# Patient Record
Sex: FEMALE | Race: WHITE | Employment: PART TIME | URBAN - METROPOLITAN AREA
[De-identification: names, ages, dates, MRNs, and addresses within clinical notes are randomized per-mention and may not be internally consistent; named-entity substitution may affect disease eponyms.]

---

## 2019-03-03 ENCOUNTER — HOSPITAL ENCOUNTER (EMERGENCY)
Age: 44
Discharge: HOME | End: 2019-03-03

## 2019-03-03 VITALS
RESPIRATION RATE: 20 BRPM | SYSTOLIC BLOOD PRESSURE: 126 MMHG | OXYGEN SATURATION: 95 % | DIASTOLIC BLOOD PRESSURE: 89 MMHG | WEIGHT: 170 LBS | BODY MASS INDEX: 30.12 KG/M2 | HEART RATE: 73 BPM | HEIGHT: 63 IN | TEMPERATURE: 98.5 F

## 2019-03-03 DIAGNOSIS — S29.019A THORACIC MYOFASCIAL STRAIN, INITIAL ENCOUNTER: Primary | ICD-10-CM

## 2019-03-03 LAB
HCG, URINE, POC: NEGATIVE
Lab: NORMAL
NEGATIVE QC PASS/FAIL: NORMAL
POSITIVE QC PASS/FAIL: NORMAL

## 2019-03-03 PROCEDURE — 99283 EMERGENCY DEPT VISIT LOW MDM: CPT

## 2019-03-03 PROCEDURE — 6360000002 HC RX W HCPCS: Performed by: PHYSICIAN ASSISTANT

## 2019-03-03 PROCEDURE — 96372 THER/PROPH/DIAG INJ SC/IM: CPT

## 2019-03-03 RX ORDER — KETOROLAC TROMETHAMINE 10 MG/1
10 TABLET, FILM COATED ORAL EVERY 6 HOURS PRN
Qty: 12 TABLET | Refills: 0 | Status: SHIPPED | OUTPATIENT
Start: 2019-03-03 | End: 2021-10-26

## 2019-03-03 RX ORDER — KETOROLAC TROMETHAMINE 10 MG/1
10 TABLET, FILM COATED ORAL EVERY 6 HOURS PRN
Qty: 12 TABLET | Refills: 0 | Status: SHIPPED | OUTPATIENT
Start: 2019-03-03 | End: 2019-03-03

## 2019-03-03 RX ORDER — ORPHENADRINE CITRATE 100 MG/1
100 TABLET, EXTENDED RELEASE ORAL 2 TIMES DAILY
Qty: 20 TABLET | Refills: 0 | Status: SHIPPED | OUTPATIENT
Start: 2019-03-03 | End: 2019-03-13 | Stop reason: ALTCHOICE

## 2019-03-03 RX ORDER — ORPHENADRINE CITRATE 30 MG/ML
60 INJECTION INTRAMUSCULAR; INTRAVENOUS ONCE
Status: COMPLETED | OUTPATIENT
Start: 2019-03-03 | End: 2019-03-03

## 2019-03-03 RX ORDER — KETOROLAC TROMETHAMINE 30 MG/ML
30 INJECTION, SOLUTION INTRAMUSCULAR; INTRAVENOUS ONCE
Status: COMPLETED | OUTPATIENT
Start: 2019-03-03 | End: 2019-03-03

## 2019-03-03 RX ADMIN — KETOROLAC TROMETHAMINE 30 MG: 30 INJECTION, SOLUTION INTRAMUSCULAR; INTRAVENOUS at 08:51

## 2019-03-03 RX ADMIN — ORPHENADRINE CITRATE 60 MG: 60 INJECTION INTRAMUSCULAR; INTRAVENOUS at 08:51

## 2019-03-03 ASSESSMENT — PAIN SCALES - GENERAL
PAINLEVEL_OUTOF10: 10
PAINLEVEL_OUTOF10: 9

## 2019-03-04 ENCOUNTER — APPOINTMENT (OUTPATIENT)
Dept: CT IMAGING | Age: 44
End: 2019-03-04

## 2019-03-04 ENCOUNTER — HOSPITAL ENCOUNTER (EMERGENCY)
Age: 44
Discharge: HOME | End: 2019-03-04
Attending: EMERGENCY MEDICINE

## 2019-03-04 VITALS
RESPIRATION RATE: 20 BRPM | BODY MASS INDEX: 30.12 KG/M2 | TEMPERATURE: 97.7 F | DIASTOLIC BLOOD PRESSURE: 91 MMHG | SYSTOLIC BLOOD PRESSURE: 134 MMHG | HEIGHT: 63 IN | HEART RATE: 67 BPM | WEIGHT: 170 LBS | OXYGEN SATURATION: 97 %

## 2019-03-04 DIAGNOSIS — M54.2 NECK PAIN: ICD-10-CM

## 2019-03-04 DIAGNOSIS — M62.838 SPASM OF MUSCLE: Primary | ICD-10-CM

## 2019-03-04 PROCEDURE — 96372 THER/PROPH/DIAG INJ SC/IM: CPT

## 2019-03-04 PROCEDURE — 6370000000 HC RX 637 (ALT 250 FOR IP): Performed by: EMERGENCY MEDICINE

## 2019-03-04 PROCEDURE — 99283 EMERGENCY DEPT VISIT LOW MDM: CPT

## 2019-03-04 PROCEDURE — 72125 CT NECK SPINE W/O DYE: CPT

## 2019-03-04 PROCEDURE — 6360000002 HC RX W HCPCS: Performed by: EMERGENCY MEDICINE

## 2019-03-04 RX ORDER — DIAZEPAM 5 MG/1
5 TABLET ORAL ONCE
Status: COMPLETED | OUTPATIENT
Start: 2019-03-04 | End: 2019-03-04

## 2019-03-04 RX ORDER — ORPHENADRINE CITRATE 30 MG/ML
60 INJECTION INTRAMUSCULAR; INTRAVENOUS ONCE
Status: COMPLETED | OUTPATIENT
Start: 2019-03-04 | End: 2019-03-04

## 2019-03-04 RX ORDER — HYDROCODONE BITARTRATE AND ACETAMINOPHEN 5; 325 MG/1; MG/1
1 TABLET ORAL ONCE
Status: COMPLETED | OUTPATIENT
Start: 2019-03-04 | End: 2019-03-04

## 2019-03-04 RX ORDER — KETOROLAC TROMETHAMINE 30 MG/ML
60 INJECTION, SOLUTION INTRAMUSCULAR; INTRAVENOUS ONCE
Status: COMPLETED | OUTPATIENT
Start: 2019-03-04 | End: 2019-03-04

## 2019-03-04 RX ORDER — METHYLPREDNISOLONE 4 MG/1
TABLET ORAL
Qty: 1 KIT | Refills: 0 | Status: SHIPPED | OUTPATIENT
Start: 2019-03-04 | End: 2019-03-10

## 2019-03-04 RX ADMIN — KETOROLAC TROMETHAMINE 60 MG: 30 INJECTION, SOLUTION INTRAMUSCULAR at 03:14

## 2019-03-04 RX ADMIN — ORPHENADRINE CITRATE 60 MG: 60 INJECTION INTRAMUSCULAR; INTRAVENOUS at 03:14

## 2019-03-04 RX ADMIN — HYDROCODONE BITARTRATE AND ACETAMINOPHEN 1 TABLET: 5; 325 TABLET ORAL at 04:19

## 2019-03-04 RX ADMIN — DIAZEPAM 5 MG: 5 TABLET ORAL at 03:13

## 2019-03-04 ASSESSMENT — ENCOUNTER SYMPTOMS
SHORTNESS OF BREATH: 0
WHEEZING: 0
SORE THROAT: 0
ABDOMINAL PAIN: 0
SINUS PRESSURE: 0
ABDOMINAL DISTENTION: 0
DIARRHEA: 0
BACK PAIN: 0
CHEST TIGHTNESS: 0
COUGH: 0
NAUSEA: 0
VOMITING: 0

## 2019-03-04 ASSESSMENT — PAIN SCALES - GENERAL
PAINLEVEL_OUTOF10: 10

## 2019-03-04 ASSESSMENT — PAIN DESCRIPTION - FREQUENCY: FREQUENCY: CONTINUOUS

## 2019-03-04 ASSESSMENT — PAIN DESCRIPTION - ORIENTATION: ORIENTATION: LEFT

## 2019-03-04 ASSESSMENT — PAIN DESCRIPTION - PAIN TYPE
TYPE: ACUTE PAIN
TYPE: ACUTE PAIN

## 2019-03-04 ASSESSMENT — PAIN DESCRIPTION - LOCATION: LOCATION: NECK;SHOULDER

## 2019-03-04 ASSESSMENT — PAIN DESCRIPTION - PROGRESSION: CLINICAL_PROGRESSION: RAPIDLY WORSENING

## 2019-03-04 ASSESSMENT — PAIN DESCRIPTION - DESCRIPTORS: DESCRIPTORS: ACHING

## 2019-03-13 ENCOUNTER — OFFICE VISIT (OUTPATIENT)
Dept: PHYSICAL MEDICINE AND REHAB | Age: 44
End: 2019-03-13

## 2019-03-13 VITALS
SYSTOLIC BLOOD PRESSURE: 118 MMHG | WEIGHT: 177 LBS | HEART RATE: 84 BPM | HEIGHT: 63 IN | BODY MASS INDEX: 31.36 KG/M2 | DIASTOLIC BLOOD PRESSURE: 66 MMHG

## 2019-03-13 DIAGNOSIS — M62.838 TRAPEZIUS MUSCLE SPASM: Primary | ICD-10-CM

## 2019-03-13 PROCEDURE — 96372 THER/PROPH/DIAG INJ SC/IM: CPT | Performed by: PHYSICAL MEDICINE & REHABILITATION

## 2019-03-13 PROCEDURE — 99204 OFFICE O/P NEW MOD 45 MIN: CPT | Performed by: PHYSICAL MEDICINE & REHABILITATION

## 2019-03-13 RX ORDER — KETOROLAC TROMETHAMINE 30 MG/ML
30 INJECTION, SOLUTION INTRAMUSCULAR; INTRAVENOUS ONCE
Status: COMPLETED | OUTPATIENT
Start: 2019-03-13 | End: 2019-03-13

## 2019-03-13 RX ORDER — CYCLOBENZAPRINE HCL 5 MG
5-10 TABLET ORAL 3 TIMES DAILY PRN
Qty: 30 TABLET | Refills: 2 | Status: SHIPPED | OUTPATIENT
Start: 2019-03-13 | End: 2019-03-23

## 2019-03-13 RX ORDER — KETOROLAC TROMETHAMINE 10 MG/1
10 TABLET, FILM COATED ORAL EVERY 6 HOURS PRN
Qty: 20 TABLET | Refills: 0 | Status: SHIPPED | OUTPATIENT
Start: 2019-03-13 | End: 2021-10-26

## 2019-03-13 RX ORDER — ETODOLAC 400 MG/1
400 TABLET, FILM COATED ORAL 2 TIMES DAILY
Refills: 2 | COMMUNITY
Start: 2019-03-05 | End: 2019-03-13 | Stop reason: ALTCHOICE

## 2019-03-13 RX ADMIN — KETOROLAC TROMETHAMINE 30 MG: 30 INJECTION, SOLUTION INTRAMUSCULAR; INTRAVENOUS at 08:41

## 2019-03-20 ENCOUNTER — OFFICE VISIT (OUTPATIENT)
Dept: PHYSICAL MEDICINE AND REHAB | Age: 44
End: 2019-03-20

## 2019-03-20 VITALS
HEIGHT: 63 IN | SYSTOLIC BLOOD PRESSURE: 116 MMHG | WEIGHT: 180 LBS | BODY MASS INDEX: 31.89 KG/M2 | DIASTOLIC BLOOD PRESSURE: 68 MMHG | HEART RATE: 88 BPM

## 2019-03-20 DIAGNOSIS — M79.18 MYOFASCIAL PAIN: ICD-10-CM

## 2019-03-20 DIAGNOSIS — M54.2 NECK PAIN: Primary | ICD-10-CM

## 2019-03-20 DIAGNOSIS — M54.12 CERVICAL RADICULOPATHY: ICD-10-CM

## 2019-03-20 PROCEDURE — 99214 OFFICE O/P EST MOD 30 MIN: CPT | Performed by: PHYSICAL MEDICINE & REHABILITATION

## 2019-03-20 PROCEDURE — 20553 NJX 1/MLT TRIGGER POINTS 3/>: CPT | Performed by: PHYSICAL MEDICINE & REHABILITATION

## 2019-03-20 RX ORDER — KETOROLAC TROMETHAMINE 30 MG/ML
30 INJECTION, SOLUTION INTRAMUSCULAR; INTRAVENOUS ONCE
Status: COMPLETED | OUTPATIENT
Start: 2019-03-20 | End: 2019-03-20

## 2019-03-20 RX ORDER — LIDOCAINE HYDROCHLORIDE 10 MG/ML
5 INJECTION, SOLUTION INFILTRATION; PERINEURAL ONCE
Status: COMPLETED | OUTPATIENT
Start: 2019-03-20 | End: 2019-03-20

## 2019-03-20 RX ADMIN — KETOROLAC TROMETHAMINE 30 MG: 30 INJECTION, SOLUTION INTRAMUSCULAR; INTRAVENOUS at 10:47

## 2019-03-20 RX ADMIN — LIDOCAINE HYDROCHLORIDE 5 ML: 10 INJECTION, SOLUTION INFILTRATION; PERINEURAL at 10:47

## 2019-03-26 ENCOUNTER — TELEPHONE (OUTPATIENT)
Dept: PHYSICAL MEDICINE AND REHAB | Age: 44
End: 2019-03-26

## 2019-04-01 ENCOUNTER — EVALUATION (OUTPATIENT)
Dept: PHYSICAL THERAPY | Age: 44
End: 2019-04-01

## 2019-04-01 DIAGNOSIS — M79.18 MYOFASCIAL PAIN: ICD-10-CM

## 2019-04-01 DIAGNOSIS — M54.12 CERVICAL RADICULOPATHY: ICD-10-CM

## 2019-04-01 DIAGNOSIS — M54.2 NECK PAIN: Primary | ICD-10-CM

## 2019-04-01 PROCEDURE — 97162 PT EVAL MOD COMPLEX 30 MIN: CPT | Performed by: PHYSICAL THERAPIST

## 2019-04-01 PROCEDURE — 97112 NEUROMUSCULAR REEDUCATION: CPT | Performed by: PHYSICAL THERAPIST

## 2019-04-01 NOTE — PROGRESS NOTES
Physical Therapy Daily Treatment Note    Date: 2019  Patient Name: Brianna Coker  : 1975   MRN: [id number]  DOInjury: 2019, 2018  DOSx: --   Referring Provider: Darwin Roy DO  [address] [place] [address], [address] [facility]     Medical Diagnosis:      Diagnosis Orders   1. Neck pain     2. Myofascial pain     3. Cervical radiculopathy         S: see eval  O:  Time 8363-2955     Visit      Pain 2/10     ROM Dec ext, SB, ROT     Modalities      MH + ES If needed  MO   Manual         MT         Exercise   TE   Supine neck flexion   NR   Quadruped neck retraction   NR   Cervical lateral flexion isometrics  5 sec hold 2 x 10  NR   Cervical extension isometrics 5 sec hold 2 x 10  NR   Cervical flexion isometrics 5 sec hold 2 x 10  NR   UBE  or   TE   Nustep     TE   ROWS: H  Lat Pull Down Next   TA   ROWS: M Seated row  Next   TA   ROWS: L   TA   Biceps  Next   TA   Triceps  Next   TA   Shrugs   TE   Shoulder Press Next   TE      TE               A:  Tolerated well. Above added to written HEP. Feedback and cues necessary for developing neuromuscular control. Movement education and guided movement interventions used to improve performance and control. P: Continue with rehab plan. Will progress to sub-max gym program next.   Jimena Jones, PT    Treatment Charges: Mins Units   Initial Evaluation 45 1   Re-Evaluation     Ther Exercise         TE     Manual Therapy     MT     Ther Activities        TA     Gait Training          GT     Neuro Re-education NR 15 1   Modalities     Non-Billable Service Time     Other     Total Time/Units 60 2

## 2019-04-01 NOTE — PROGRESS NOTES
(TORADOL) 10 MG tablet Take 1 tablet by mouth every 6 hours as needed for Pain (with food) 20 tablet 0    ketorolac (TORADOL) 10 MG tablet Take 1 tablet by mouth every 6 hours as needed for Pain Patient had IM Toradol here in the ER. 12 tablet 0     No current facility-administered medications for this visit. Imaging results: Ct Cervical Spine Wo Contrast    Result Date: 3/4/2019  LOCATION: 200 EXAM: CT CERVICAL SPINE WO CONTRAST COMPARISON: None HISTORY: Neck pain TECHNIQUE: Axial CT images of the cervical spine were obtained without contrast were obtained. FINDINGS: Reversal of the normal lordosis of the cervical spine noted. The spinal pedicles as well as the transverse and spinous processes are well visualized. There is no evidence of fracture or malalignment. Soft tissues appear normal.     1. No fractures. 2. Reversal of the normal lordosis likely due to neck spasm. Mri Cervical Spine Wo Contrast    Result Date: 3/22/2019  LOCATION: 100 LOCATION: 200 MRI CERVICAL SPINE WITHOUT IV CONTRAST Clinical indication ; neck pain. No previous study for comparison. TECHNIQUE; Cervical spine was imaged in sagittal with T1, T2, and STIR sequences and axial plane with proton density and T2 weighted sequences. FINDINGS; The vertebral bodies are normal in height and signal intensity. The cervical spine assumes kyphosis losing its anatomic lordosis but without significant spondylolisthesis. Disc osteophyte complex effacing the anterior thecal sac and causing moderate spinal canal stenosis at C3-C4, C4-C5, C5-C6, and C6-C7 levels. The cervical cord is normal in configuration and signal intensity. The visualized brain stem is normal in configuration and signal intensity. The posterior fossa structures are normal in configuration and signal intensity. The intervertebral disc is normal in height and shows no evidence of HNP.  Neural foraminal narrowing, mild on the right and moderate on the left at C4-C5 level, moderate on the right and mild on the left at C5-C6 level, moderate to severe bilaterally at C6-C7 level, and mild bilaterally at C7-T1 level. No evidence of syrinx or Chiari malformation. The visualized soft tissue is grossly unremarkable. The airway is intact. 1. Neural foraminal narrowing, mild on the right and moderate on the left at C4-C5 level, moderate on the right and mild on the left at C5-C6 level, moderate to severe bilaterally at C6-C7 level, and mild bilaterally at C7-T1 level. 2. Moderate spinal canal stenosis at C3-C4, C4-C5, C5-C6, and C6-C7 levels. 3. Kyphosis of the cervical spine alignment. This could be secondary to muscle spasm in the neck. Pain:  Current: 2/10         Symptom Type/Quality: burning, tingling  Location[de-identified] Neck: L upper tap. Denies L UE symptoms. Behavior: condition is improving      Provoking Activities/Positions: Prolonged neck flexion, neck extension                  Relieving Activitie/Positions: Neutral position      Disturbed Sleep:  Yes [x]    No []    Occupation: Nail tech. Exercise regimen: spinning, yoga, gym workouts. Has memberships at both [employer] and [employer]. Patient Goals: Pain relief. GEt back into strengthening.      Contraindications/Precautions: None    OBJECTIVE:     Mood: normal affect    Inspection:  Standing    Cervical: Forward Head   [] Normal   [x]Mild   [] Moderate   []Severe      Thoracic:         [x] Normal   [] Increased Kyphosis  [] Decreased Kyphosis    Lumbar:   [x] Normal   [] Increased Lordosis   [] Decreased Lordosis           Observations: Normal orthopedic exam    Range of Motion:    Neck:   Flexion:  [x] Normal   [] Limited    Extension:  [] Normal   [x] Limited 50%    Right Rotation: [] Normal   [x] Limited 25%   Left Rotation:  [] Normal   [x] Limited 25%   Right Side Bending: [] Normal   [x] Limited 25%   Left Side Bending: [] Normal   [x] Limited 25%    Upper Extremity:   Right:   [x] Normal   [] Limited Left:   [x] Normal   [] Limited     Strength:     Neck: 4/5   R UE: 5/5   L UE: 5/5    Palpation: Tender to palpation superior angle L scapula with palpable tone      Sensation: intact to light touch and temperature. Special Tests:  [x] Nerve Root Compression           Right []+ / [x] -    Left []+ / [x] -  [x] Cervical Distraction []+ / [x] -    [x] Spurling's Test           Right []+ / [x] -    Left []+ / [x] -     [] Other: []+ / [] -         Special Test Comments: Compressive tests cause pain, not neurological symptoms     ASSESSMENT     Problems:   1. Pain reported 2/10  2. ROM limited 25-50%  3. Strength 4/5  4. Decreased functional ability with work and high-level tasks     [x] There are no barriers affecting plan of care or recovery    [] Barriers to this patient's plan of care or recovery include. Domestic Concerns:  [x] No  [] Yes:    Short Term goals (3 weeks)  1. Decrease reported pain to 1/10  2. Able to perform/complete the following functions/tasks: basic, sub-maximal gym program    Long Term goals (6 weeks)  1. Decrease reported pain to 0/10  2. Increase ROM to full  3. Increase Strength to 5/5   4. Able to perform/complete the following functions/tasks: moderate intensity gym program  5. Independent with Home Exercise Programs    Rehab Potential: [x] Good  [] Fair  [] Poor    PLAN       Treatment Plan:  [x] Therapeutic Exercise  [x] Therapeutic Activity  [x] Neuromuscular Re-education   [] Gait Training  [] Balance Training  [] Aerobic conditioning  [] Manual Therapy  [] Massage/Fascial release   [] Work/Sport specific activities    [] Pain Neuroscience [x] Cold/hotpack  [] Vasocompression  [x] Electrical Stimulation  [] Lumbar/Cervical Traction  [] Ultrasound   [] Iontophoresis: 4 mg/mL Dexamethasone Sodium Phosphate 40-80 mAmin        [x] Instruction in HEP      []  Medication allergies reviewed for use of Dexamethasone Sodium Phosphate 4mg/ml  with iontophoresis treatments.   Patient is not allergic. Suggested Professional Referral: [x] No  [] Yes:     Patient Education:  [x] Plans/Goals, Risks/Benefits discussed  [x] Home exercise program  Method of Education: [x] Verbal  [x] Demo  [x] Written  Comprehension of Education:  [x] Verbalizes understanding. [x] Demonstrates understanding. [] Needs Review. [] Demonstrates/verbalizes understanding of HEP/Ed previously given. Frequency:  2 times per week for 4-6 weeks    Patient understands diagnosis/prognosis and consents to treatment, plan and goals: [x] Yes    [] No     Thank you for the opportunity to work with your patient. If you have questions or comments, please contact me at [phone]; fax: [phone]. Electronically signed by: Gail Dowling PT         Please sign Physician's Certification and return to: [place] [address]  Dept: [phone]  Dept Fax: [phone] Certification / Comments     Frequency/Duration 2 / week for 4-6 weeks. Certification period From: 04/01/2019  To: 06/01/2019    I have reviewed the Plan of Care established for skilled therapy services and certify that the services are required and that they will be provided while the patient is under my care.     Physician's Comments/Revisions:               Physician's Printed Name:                                           [de-identified] Signature:                                                               Date:

## 2019-04-03 ENCOUNTER — TREATMENT (OUTPATIENT)
Dept: PHYSICAL THERAPY | Age: 44
End: 2019-04-03

## 2019-04-03 DIAGNOSIS — M79.18 MYOFASCIAL PAIN: ICD-10-CM

## 2019-04-03 DIAGNOSIS — M54.12 CERVICAL RADICULOPATHY: ICD-10-CM

## 2019-04-03 DIAGNOSIS — M54.2 NECK PAIN: Primary | ICD-10-CM

## 2019-04-03 PROCEDURE — G0283 ELEC STIM OTHER THAN WOUND: HCPCS | Performed by: PHYSICAL THERAPIST

## 2019-04-03 PROCEDURE — 97530 THERAPEUTIC ACTIVITIES: CPT | Performed by: PHYSICAL THERAPIST

## 2019-04-03 PROCEDURE — 97110 THERAPEUTIC EXERCISES: CPT | Performed by: PHYSICAL THERAPIST

## 2019-04-08 ENCOUNTER — TREATMENT (OUTPATIENT)
Dept: PHYSICAL THERAPY | Age: 44
End: 2019-04-08

## 2019-04-08 DIAGNOSIS — M54.12 CERVICAL RADICULOPATHY: ICD-10-CM

## 2019-04-08 DIAGNOSIS — M54.2 NECK PAIN: Primary | ICD-10-CM

## 2019-04-08 DIAGNOSIS — M79.18 MYOFASCIAL PAIN: ICD-10-CM

## 2019-04-08 PROCEDURE — 97530 THERAPEUTIC ACTIVITIES: CPT | Performed by: PHYSICAL THERAPIST

## 2019-04-08 NOTE — PROGRESS NOTES
Physical Therapy Daily Treatment Note    Date: 2019  Patient Name: Tere Moore  : 1975   MRN: [id number]  DOInjury: 2019, 2018  DOSx: --   Referring Provider: Jocelin Blanco DO  [address] [place] [address], [address] [facility]     Medical Diagnosis:      Diagnosis Orders   1. Neck pain     2. Myofascial pain     3. Cervical radiculopathy         S: No new report. States she went to gym Saturday and did same exercises as she did at PT and it went well. O:  Time 2476-5522     Visit 3/12     Pain 2/10     ROM Dec ext, SB, ROT     Modalities      MH + ES   MO   Manual         MT         Exercise   TE   Supine neck flexion   NR   Quadruped neck retraction   NR   Cervical lateral flexion isometrics  Reviewed   TE   Cervical extension isometrics Reviewed  TE   Cervical flexion isometrics Reviewed  TE   UBE  or   TE   Nustep     TE     Lat Pull Down 25 lbs 3 x 15  TA    Seated row  25 lbs 3 x 15  TA   ROWS: L 15 lbs 3 x 15  TA   Biceps  5 lbs 3 x 15  TA   Triceps press down 10 lbs 3 x 15  TA   Shrugs 5 lbs 3 x 15  TA   Shoulder Press 3 lbs 3 x 15  TA                     A:  Tolerated well. Large, functional, dynamic, global movements used to build strength, balance, endurance, and flexibility and to improve physical performance. P: Continue with rehab plan. Patient to perform cardio daily as tolerated. She can work out in [employer] to tolerance (LE, core).   Changba, PT    Treatment Charges: Mins Units   Initial Evaluation     Re-Evaluation     Ther Exercise         TE     Manual Therapy     MT     Ther Activities        TA 35 2   Gait Training          GT     Neuro Re-education NR     Modalities           E-stim     Non-Billable Service Time     Other     Total Time/Units 35 2

## 2019-04-10 ENCOUNTER — TREATMENT (OUTPATIENT)
Dept: PHYSICAL THERAPY | Age: 44
End: 2019-04-10

## 2019-04-10 DIAGNOSIS — M54.12 CERVICAL RADICULOPATHY: ICD-10-CM

## 2019-04-10 DIAGNOSIS — M79.18 MYOFASCIAL PAIN: ICD-10-CM

## 2019-04-10 DIAGNOSIS — M54.2 NECK PAIN: Primary | ICD-10-CM

## 2019-04-10 PROCEDURE — G0283 ELEC STIM OTHER THAN WOUND: HCPCS | Performed by: PHYSICAL THERAPIST

## 2019-04-10 PROCEDURE — 97530 THERAPEUTIC ACTIVITIES: CPT | Performed by: PHYSICAL THERAPIST

## 2019-04-10 NOTE — PROGRESS NOTES
Physical Therapy Daily Treatment Note    Date: 4/10/2019  Patient Name: Christa Smith  : 1975   MRN: [id number]  DOInjury: 2019, 2018  DOSx: --   Referring Provider: Masoud Pineda DO  [address] [place] [address], [address] [facility]     Medical Diagnosis:      Diagnosis Orders   1. Neck pain     2. Myofascial pain     3. Cervical radiculopathy         S: A little sore (muscle sore). O:  Time 3051-1419     Visit      Pain 2/10     ROM Dec ext, SB, ROT     Modalities       Ice + ES PreMod L upper trap  Supine X 20 min  MO   Manual         MT   Exercise   TE   Supine neck flexion   NR   Quadruped neck retraction   NR   Cervical lateral flexion isometrics  Reviewed   TE   Cervical extension isometrics Reviewed  TE   Cervical flexion isometrics Reviewed  TE   UBE  or   TE   Nustep     TE     Lat Pull Down 25 lbs 3 x 15  TA    Seated row  25 lbs 3 x 15  TA   ROWS: L 15 lbs 3 x 15  TA   Biceps  5 lbs 3 x 15  TA   Triceps press down 10 lbs 3 x 15  TA   Shrugs 5 lbs 3 x 15  TA   Shoulder Press 3 lbs 3 x 15  TA                     A:  Tolerated well. Large, functional, dynamic, global movements used to build strength, balance, endurance, and flexibility and to improve physical performance. P: Continue with rehab plan. Patient to perform cardio daily as tolerated. She can work out in [email or web address] to tolerance (LE, core).   Radha Richards, PT    Treatment Charges: Mins Units   Initial Evaluation     Re-Evaluation     Ther Exercise         TE     Manual Therapy     MT     Ther Activities        TA 35 2   Gait Training          GT     Neuro Re-education NR     Modalities           E-stim 20 1   Non-Billable Service Time 5 0   Other     Total Time/Units 35 2

## 2019-04-15 ENCOUNTER — TREATMENT (OUTPATIENT)
Dept: PHYSICAL THERAPY | Age: 44
End: 2019-04-15

## 2019-04-15 DIAGNOSIS — M79.18 MYOFASCIAL PAIN: ICD-10-CM

## 2019-04-15 DIAGNOSIS — M54.2 NECK PAIN: Primary | ICD-10-CM

## 2019-04-15 DIAGNOSIS — M54.12 CERVICAL RADICULOPATHY: ICD-10-CM

## 2019-04-15 PROCEDURE — 97530 THERAPEUTIC ACTIVITIES: CPT | Performed by: PHYSICAL THERAPIST

## 2019-04-15 NOTE — PROGRESS NOTES
Physical Therapy Daily Treatment Note    Date: 4/15/2019  Patient Name: Nadia Moss  : 1975   MRN: [id number]  DOInjury: 2019, 2018  DOSx: --   Referring Provider: Jay Bowles DO  [address] [place] [address], [address] [facility]     Medical Diagnosis:      Diagnosis Orders   1. Neck pain     2. Myofascial pain     3. Cervical radiculopathy         S: Saw DC for manipulation Friday (3 days ago). Was sore next day, feeling better today. O:  Time 8382-9342     Visit      Pain 2/10     ROM Dec ext, SB, ROT     Modalities       Ice + ES   MO   Manual         MT   Exercise   TE   Supine neck flexion   NR   Quadruped neck retraction   NR   Cervical lateral flexion isometrics  Reviewed   TE   Cervical extension isometrics Reviewed  TE   Cervical flexion isometrics Reviewed  TE   UBE  or   TE   Nustep     TE     Lat Pull Down 40 lbs 3 x 15  TA    Seated row  25 lbs 3 x 15  TA   ROWS: L 15 lbs 3 x 15  TA   Biceps  5 lbs 3 x 15  TA   Triceps press down 15 lbs 1 x 15  10 lbs 2 x 15  TA   Shrugs 5 lbs 3 x 15  TA   Shoulder Press 3 lbs 1 x 15  5 lbs 2 x 15  TA   Bilateral bent over row 5 lbs 3 x 15                 A:  Tolerated well. Large, functional, dynamic, global movements used to build strength, balance, endurance, and flexibility and to improve physical performance. P: Continue with rehab plan. Patient to perform cardio daily as tolerated. She can work out in [employer] to tolerance (LE, core).   Anyi Terry, PT    Treatment Charges: Mins Units   Initial Evaluation     Re-Evaluation     Ther Exercise         TE     Manual Therapy     MT     Ther Activities        TA 35 2   Gait Training          GT     Neuro Re-education NR     Modalities           E-stim     Non-Billable Service Time     Other     Total Time/Units 35 2

## 2019-04-17 ENCOUNTER — TREATMENT (OUTPATIENT)
Dept: PHYSICAL THERAPY | Age: 44
End: 2019-04-17

## 2019-04-17 DIAGNOSIS — M54.2 NECK PAIN: Primary | ICD-10-CM

## 2019-04-17 DIAGNOSIS — M79.18 MYOFASCIAL PAIN: ICD-10-CM

## 2019-04-17 DIAGNOSIS — M54.12 CERVICAL RADICULOPATHY: ICD-10-CM

## 2019-04-17 PROCEDURE — 97530 THERAPEUTIC ACTIVITIES: CPT | Performed by: PHYSICAL THERAPIST

## 2019-04-17 NOTE — PROGRESS NOTES
Physical Therapy Daily Treatment Note    Date: 2019  Patient Name: Lissette Londono  : 1975   MRN: [id number]  DOInjury: 2019, 2018  DOSx: --   Referring Provider: Suresh Castellanos DO  [address] [place] [address], [address] [facility]     Medical Diagnosis:      Diagnosis Orders   1. Neck pain     2. Myofascial pain     3. Cervical radiculopathy         S: Problem area around shoulder blade persists, but she is more active and no worse. O:  Time 0295-0413     Visit      Pain 2/10     ROM Dec ext, SB, ROT     Modalities       Ice + ES   MO   Manual         MT   Exercise   TE   Supine neck flexion   NR   Quadruped neck retraction   NR   Cervical lateral flexion isometrics  Reviewed   TE   Cervical extension isometrics Reviewed  TE   Cervical flexion isometrics Reviewed  TE   UBE  or   TE   Nustep     TE     Lat Pull Down 40 lbs 3 x 15  TA    Seated row  25 lbs 3 x 15  TA   ROWS: L 15 lbs 3 x 15  TA   Biceps  5 lbs 3 x 15  TA   Triceps press down 15 lbs 3 x 15  TA   Shrugs 5 lbs 3 x 15  TA   Shoulder Press 5 lbs 3 x 15  TA   Bilateral bent over row 5 lbs 3 x 15                 A:  Tolerated well. Large, functional, dynamic, global movements used to build strength, balance, endurance, and flexibility and to improve physical performance. P: Continue with rehab plan. Patient to perform cardio daily as tolerated. She can work out in [employer] to tolerance (LE, core).   Rockford Aguiar, PT    Treatment Charges: Mins Units   Initial Evaluation     Re-Evaluation     Ther Exercise         TE     Manual Therapy     MT     Ther Activities        TA 35 2   Gait Training          GT     Neuro Re-education NR     Modalities           E-stim     Non-Billable Service Time     Other     Total Time/Units 35 2

## 2019-04-22 ENCOUNTER — TREATMENT (OUTPATIENT)
Dept: PHYSICAL THERAPY | Age: 44
End: 2019-04-22

## 2019-04-22 DIAGNOSIS — M79.18 MYOFASCIAL PAIN: ICD-10-CM

## 2019-04-22 DIAGNOSIS — M54.12 CERVICAL RADICULOPATHY: ICD-10-CM

## 2019-04-22 DIAGNOSIS — M54.2 NECK PAIN: Primary | ICD-10-CM

## 2019-04-22 PROCEDURE — 97530 THERAPEUTIC ACTIVITIES: CPT | Performed by: PHYSICAL THERAPIST

## 2019-04-22 NOTE — PROGRESS NOTES
Physical Therapy Daily Treatment Note    Date: 2019  Patient Name: Florentino Hoffman  : 1975   MRN: [id number]  DOInjury: 2019, 2018  DOSx: --   Referring Provider: Maciej Gomez DO  [address] [place] [address], [address] [facility]     Medical Diagnosis:      Diagnosis Orders   1. Neck pain     2. Myofascial pain     3. Cervical radiculopathy         S: Feels good. Feels better with exercise; feels worse when she does not exercise. Patient uses ice and ball rolling to relieve symptoms when they rise. O:  Time 3305-8503     Visit      Pain 2/10     ROM WFL     Modalities       Ice + ES   MO   Manual         MT   Exercise   TE   Supine neck flexion   NR   Quadruped neck retraction   NR   Cervical lateral flexion isometrics  Reviewed   TE   Cervical extension isometrics Reviewed  TE   Cervical flexion isometrics Reviewed  TE   UBE  or   TE   Nustep     TE     Lat Pull Down 40 lbs 3 x 15  TA    Seated row  25 lbs 3 x 15  TA   ROWS: L 15 lbs 3 x 15  TA   Biceps  5 lbs 3 x 15  TA   Triceps press down 15 lbs 3 x 15  TA   Shrugs 8 lbs 3 x 15  TA   Shoulder Press 8 lbs 3 x 15  TA   Bilateral bent over row                  A:  Tolerated well. Large, functional, dynamic, global movements used to build strength, balance, endurance, and flexibility and to improve physical performance. P: Patient is in control of current problem. She is to continue with HEP and call with questions/concerns.     Sridevi Harper, PT    Treatment Charges: Mins Units   Initial Evaluation     Re-Evaluation     Ther Exercise         TE     Manual Therapy     MT     Ther Activities        TA 35 2   Gait Training          GT     Neuro Re-education NR     Modalities           E-stim     Non-Billable Service Time     Other     Total Time/Units 35 2

## 2019-04-22 NOTE — PROGRESS NOTES
[address] [facility] OUTPATIENT REHABILITATION   PHYSICAL THERAPY DISCHARGE REPORT      Referring Provider: Roberta Montero DO  [id number] [facility] [address], [address] [facility]     Medical Diagnosis:      Diagnosis Orders   1. Neck pain     2. Myofascial pain     3. Cervical radiculopathy         ATTENDANCE:  Patient attended 7 of 7 scheduled treatments from 04/01/2019  to 04/22/2019. TREATMENTS RECEIVED:  AROM, therapeutic exercise, strengthening, HEP. INITIAL STATUS:  1. Pain reported 2/10  2. ROM limited 25-50%  3. Strength 4/5  4. Decreased functional ability with work and high-level tasks     FINAL STATUS:  · Pain 0-2/10 - comes and goes, less pain overall, able to control it, feels better with exercise  · ROM WFL  · Strength WFL  · Performing all gym and work activities   · Demonstrates understanding of exercise, exercise grading, and impact of neck positioning during exercise and work    GOALS:  Long Term Goals were obtained. PATIENT GOALS: Pain relief. GEt back into strengthening. REASON FOR DISCHARGE: Goals met    PATIENT EDUCATION/INSTRUCTIONS: Patient has HEP. She is to continue working out at [employer] and continually address workplace and exercise ergonomics. She is to call with questions/concerns. Thank you for the opportunity to work with your patient. If you have questions or comments, please contact me [phone]; fax [phone].     Isis Millard, PT 4/22/2019

## 2019-11-13 ENCOUNTER — HOSPITAL ENCOUNTER (OUTPATIENT)
Dept: GENERAL RADIOLOGY | Age: 44
Discharge: HOME | End: 2019-11-15
Payer: COMMERCIAL

## 2019-11-13 DIAGNOSIS — Z12.31 VISIT FOR SCREENING MAMMOGRAM: ICD-10-CM

## 2019-11-13 PROCEDURE — 77063 BREAST TOMOSYNTHESIS BI: CPT

## 2019-11-20 ENCOUNTER — HOSPITAL ENCOUNTER (OUTPATIENT)
Dept: GENERAL RADIOLOGY | Age: 44
End: 2019-11-20
Payer: COMMERCIAL

## 2019-11-20 ENCOUNTER — HOSPITAL ENCOUNTER (OUTPATIENT)
Dept: GENERAL RADIOLOGY | Age: 44
Discharge: HOME | End: 2019-11-22
Payer: COMMERCIAL

## 2019-11-20 DIAGNOSIS — R92.8 ABNORMAL MAMMOGRAM: ICD-10-CM

## 2019-11-20 PROCEDURE — G0279 TOMOSYNTHESIS, MAMMO: HCPCS

## 2020-12-28 ENCOUNTER — HOSPITAL ENCOUNTER (OUTPATIENT)
Dept: GENERAL RADIOLOGY | Age: 45
Discharge: HOME | End: 2020-12-30
Payer: COMMERCIAL

## 2020-12-28 PROCEDURE — 77063 BREAST TOMOSYNTHESIS BI: CPT

## 2021-02-01 ENCOUNTER — APPOINTMENT (OUTPATIENT)
Dept: CT IMAGING | Age: 46
End: 2021-02-01
Payer: COMMERCIAL

## 2021-02-01 ENCOUNTER — APPOINTMENT (OUTPATIENT)
Dept: GENERAL RADIOLOGY | Age: 46
End: 2021-02-01
Payer: COMMERCIAL

## 2021-02-01 ENCOUNTER — HOSPITAL ENCOUNTER (EMERGENCY)
Age: 46
Discharge: HOME | End: 2021-02-01
Attending: EMERGENCY MEDICINE
Payer: COMMERCIAL

## 2021-02-01 VITALS
DIASTOLIC BLOOD PRESSURE: 63 MMHG | TEMPERATURE: 98.2 F | BODY MASS INDEX: 29.06 KG/M2 | HEART RATE: 62 BPM | HEIGHT: 63 IN | OXYGEN SATURATION: 97 % | WEIGHT: 164 LBS | SYSTOLIC BLOOD PRESSURE: 114 MMHG | RESPIRATION RATE: 12 BRPM

## 2021-02-01 DIAGNOSIS — R07.9 CHEST PAIN, UNSPECIFIED TYPE: Primary | ICD-10-CM

## 2021-02-01 LAB
ANION GAP SERPL CALCULATED.3IONS-SCNC: 12 MMOL/L (ref 7–16)
BUN BLDV-MCNC: 11 MG/DL (ref 6–20)
CALCIUM SERPL-MCNC: 9 MG/DL (ref 8.6–10.2)
CHLORIDE BLD-SCNC: 107 MMOL/L (ref 98–107)
CO2: 23 MMOL/L (ref 22–29)
CREAT SERPL-MCNC: 0.8 MG/DL (ref 0.5–1)
D DIMER: 289 NG/ML DDU
GFR AFRICAN AMERICAN: >60
GFR NON-AFRICAN AMERICAN: >60 ML/MIN/1.73
GLUCOSE BLD-MCNC: 87 MG/DL (ref 74–99)
HCG, URINE, POC: NEGATIVE
HCT VFR BLD CALC: 38.4 % (ref 34–48)
HEMOGLOBIN: 13 G/DL (ref 11.5–15.5)
Lab: NORMAL
MCH RBC QN AUTO: 31.4 PG (ref 26–35)
MCHC RBC AUTO-ENTMCNC: 33.9 % (ref 32–34.5)
MCV RBC AUTO: 92.8 FL (ref 80–99.9)
NEGATIVE QC PASS/FAIL: NORMAL
PDW BLD-RTO: 12.9 FL (ref 11.5–15)
PLATELET # BLD: 203 E9/L (ref 130–450)
PMV BLD AUTO: 12.5 FL (ref 7–12)
POSITIVE QC PASS/FAIL: NORMAL
POTASSIUM SERPL-SCNC: 3.7 MMOL/L (ref 3.5–5)
RBC # BLD: 4.14 E12/L (ref 3.5–5.5)
SODIUM BLD-SCNC: 142 MMOL/L (ref 132–146)
TROPONIN: <0.01 NG/ML (ref 0–0.03)
WBC # BLD: 9.6 E9/L (ref 4.5–11.5)

## 2021-02-01 PROCEDURE — 96374 THER/PROPH/DIAG INJ IV PUSH: CPT

## 2021-02-01 PROCEDURE — 6360000004 HC RX CONTRAST MEDICATION: Performed by: RADIOLOGY

## 2021-02-01 PROCEDURE — 6370000000 HC RX 637 (ALT 250 FOR IP): Performed by: EMERGENCY MEDICINE

## 2021-02-01 PROCEDURE — 93005 ELECTROCARDIOGRAM TRACING: CPT | Performed by: EMERGENCY MEDICINE

## 2021-02-01 PROCEDURE — 84484 ASSAY OF TROPONIN QUANT: CPT

## 2021-02-01 PROCEDURE — 85378 FIBRIN DEGRADE SEMIQUANT: CPT

## 2021-02-01 PROCEDURE — 71275 CT ANGIOGRAPHY CHEST: CPT

## 2021-02-01 PROCEDURE — 85027 COMPLETE CBC AUTOMATED: CPT

## 2021-02-01 PROCEDURE — 80048 BASIC METABOLIC PNL TOTAL CA: CPT

## 2021-02-01 PROCEDURE — 6360000002 HC RX W HCPCS: Performed by: EMERGENCY MEDICINE

## 2021-02-01 PROCEDURE — 99283 EMERGENCY DEPT VISIT LOW MDM: CPT

## 2021-02-01 PROCEDURE — 71045 X-RAY EXAM CHEST 1 VIEW: CPT

## 2021-02-01 RX ORDER — OMEPRAZOLE 20 MG/1
20 CAPSULE, DELAYED RELEASE ORAL EVERY 12 HOURS
Qty: 28 CAPSULE | Refills: 0 | OUTPATIENT
Start: 2021-02-01 | End: 2021-12-14

## 2021-02-01 RX ORDER — KETOROLAC TROMETHAMINE 30 MG/ML
30 INJECTION, SOLUTION INTRAMUSCULAR; INTRAVENOUS ONCE
Status: COMPLETED | OUTPATIENT
Start: 2021-02-01 | End: 2021-02-01

## 2021-02-01 RX ADMIN — KETOROLAC TROMETHAMINE 30 MG: 30 INJECTION, SOLUTION INTRAMUSCULAR at 03:59

## 2021-02-01 RX ADMIN — IOPAMIDOL 80 ML: 755 INJECTION, SOLUTION INTRAVENOUS at 02:47

## 2021-02-01 RX ADMIN — LIDOCAINE HYDROCHLORIDE: 20 SOLUTION ORAL; TOPICAL at 02:06

## 2021-02-01 ASSESSMENT — ENCOUNTER SYMPTOMS
EYE DISCHARGE: 0
SHORTNESS OF BREATH: 0
BACK PAIN: 0
EYE PAIN: 0
ABDOMINAL DISTENTION: 0
SINUS PRESSURE: 0
COUGH: 0
NAUSEA: 0
SORE THROAT: 0
EYE REDNESS: 0
DIARRHEA: 1
WHEEZING: 0
VOMITING: 0

## 2021-02-01 ASSESSMENT — PAIN SCALES - GENERAL: PAINLEVEL_OUTOF10: 7

## 2021-02-01 ASSESSMENT — PAIN DESCRIPTION - PAIN TYPE: TYPE: ACUTE PAIN

## 2021-02-01 NOTE — ED PROVIDER NOTES
Patient is a 55 y/o female who presents to the ED with chest pain. Patient states she has had substernal chest pain for the past couple days. Tonight, the pain is radiating into her left chest and left neck. The pain is worsened when she eats. She has had pain into her throat. She denies any nausea or vomiting. She has had diarrhea. She denies any abdominal pain. She states that she tested positive for COVID-19 three days ago. She denies any fever, cough or shortness of breath. Currently, her pain is 7/10. Review of Systems   Constitutional: Negative for chills and fever. HENT: Negative for ear pain, sinus pressure and sore throat. Eyes: Negative for pain, discharge and redness. Respiratory: Negative for cough, shortness of breath and wheezing. Cardiovascular: Positive for chest pain. Gastrointestinal: Positive for diarrhea. Negative for abdominal distention, nausea and vomiting. Genitourinary: Negative for dysuria and frequency. Musculoskeletal: Negative for arthralgias and back pain. Skin: Negative for rash and wound. Neurological: Negative for weakness and headaches. Hematological: Negative for adenopathy. All other systems reviewed and are negative. Physical Exam  Vitals signs and nursing note reviewed. Constitutional:       General: She is not in acute distress. HENT:      Head: Normocephalic and atraumatic. Right Ear: External ear normal.      Left Ear: External ear normal.      Nose: Nose normal.      Mouth/Throat:      Mouth: Mucous membranes are moist.   Eyes:      Conjunctiva/sclera: Conjunctivae normal.      Pupils: Pupils are equal, round, and reactive to light. Neck:      Musculoskeletal: Normal range of motion and neck supple. Cardiovascular:      Rate and Rhythm: Normal rate and regular rhythm. Heart sounds: No murmur. Pulmonary:      Effort: Pulmonary effort is normal. No respiratory distress. Breath sounds: Normal breath sounds.  No stridor. No wheezing, rhonchi or rales. Chest:      Chest wall: No tenderness. Abdominal:      General: Bowel sounds are normal. There is no distension. Palpations: Abdomen is soft. Tenderness: There is no abdominal tenderness. There is no guarding. Musculoskeletal: Normal range of motion. General: No swelling. Right lower leg: No edema. Left lower leg: No edema. Skin:     General: Skin is warm and dry. Findings: No rash. Neurological:      Mental Status: She is alert and oriented to person, place, and time. Procedures     MDM           EKG:  Normal sinus rhythm with ventricular rate of 67. CA interval, QRS duration and QT interval within normal range. Normal axis. No ST segment abnormalities to suggest acute ischemia.         --------------------------------------------- PAST HISTORY ---------------------------------------------  Past Medical History:  has no past medical history on file. Past Surgical History:  has a past surgical history that includes Tonsillectomy. Social History:  reports that she has been smoking. She has been smoking about 0.50 packs per day. She has never used smokeless tobacco. She reports that she does not drink alcohol or use drugs. Family History: family history is not on file. The patients home medications have been reviewed. Allergies: Patient has no known allergies.     -------------------------------------------------- RESULTS -------------------------------------------------  Labs:  Results for orders placed or performed during the hospital encounter of 65/62/62   Basic metabolic panel   Result Value Ref Range    Sodium 142 132 - 146 mmol/L    Potassium 3.7 3.5 - 5.0 mmol/L    Chloride 107 98 - 107 mmol/L    CO2 23 22 - 29 mmol/L    Anion Gap 12 7 - 16 mmol/L    Glucose 87 74 - 99 mg/dL    BUN 11 6 - 20 mg/dL    CREATININE 0.8 0.5 - 1.0 mg/dL    GFR Non-African American >60 >=60 mL/min/1.73    GFR African American >60     Calcium 9.0 8.6 - 10.2 mg/dL   CBC   Result Value Ref Range    WBC 9.6 4.5 - 11.5 E9/L    RBC 4.14 3.50 - 5.50 E12/L    Hemoglobin 13.0 11.5 - 15.5 g/dL    Hematocrit 38.4 34.0 - 48.0 %    MCV 92.8 80.0 - 99.9 fL    MCH 31.4 26.0 - 35.0 pg    MCHC 33.9 32.0 - 34.5 %    RDW 12.9 11.5 - 15.0 fL    Platelets 308 126 - 314 E9/L    MPV 12.5 (H) 7.0 - 12.0 fL   Troponin   Result Value Ref Range    Troponin <0.01 0.00 - 0.03 ng/mL   D-Dimer, Quantitative   Result Value Ref Range    D-Dimer, Quant 289 ng/mL DDU   POC Pregnancy Urine Qual   Result Value Ref Range    HCG, Urine, POC Negative Negative    Lot Number [id number]     Positive QC Pass/Fail Pass     Negative QC Pass/Fail Pass        Radiology:  CTA CHEST W CONTRAST   Final Result   No evidence of pulmonary embolism or acute pulmonary abnormality. XR CHEST PORTABLE   Final Result   No acute findings. ------------------------- NURSING NOTES AND VITALS REVIEWED ---------------------------  Date / Time Roomed:  2/1/2021  1:04 AM  ED Bed Assignment:  07/07    The nursing notes within the ED encounter and vital signs as below have been reviewed. /63   Pulse 62   Temp 98.2 °F (36.8 °C) (Oral)   Resp 12   Ht 5' 3\" (1.6 m)   Wt 164 lb (74.4 kg)   LMP 01/04/2021   SpO2 97%   BMI 29.05 kg/m²   Oxygen Saturation Interpretation: Normal      ------------------------------------------ PROGRESS NOTES ------------------------------------------  I have spoken with the patient and discussed todays results, in addition to providing specific details for the plan of care and counseling regarding the diagnosis and prognosis. Their questions are answered at this time and they are agreeable with the plan. I discussed at length with them reasons for immediate return here for re evaluation. They will followup with primary care by calling their office tomorrow.       --------------------------------- ADDITIONAL PROVIDER NOTES

## 2021-02-03 LAB
EKG ATRIAL RATE: 67 BPM
EKG P AXIS: 42 DEGREES
EKG P-R INTERVAL: 132 MS
EKG Q-T INTERVAL: 394 MS
EKG QRS DURATION: 82 MS
EKG QTC CALCULATION (BAZETT): 416 MS
EKG R AXIS: 53 DEGREES
EKG T AXIS: 42 DEGREES
EKG VENTRICULAR RATE: 67 BPM

## 2021-10-26 ENCOUNTER — HOSPITAL ENCOUNTER (EMERGENCY)
Age: 46
Discharge: HOME | End: 2021-10-26
Payer: COMMERCIAL

## 2021-10-26 VITALS
HEIGHT: 63 IN | OXYGEN SATURATION: 100 % | HEART RATE: 73 BPM | DIASTOLIC BLOOD PRESSURE: 88 MMHG | WEIGHT: 161 LBS | BODY MASS INDEX: 28.53 KG/M2 | TEMPERATURE: 97.5 F | RESPIRATION RATE: 18 BRPM | SYSTOLIC BLOOD PRESSURE: 161 MMHG

## 2021-10-26 DIAGNOSIS — J01.40 ACUTE PANSINUSITIS, RECURRENCE NOT SPECIFIED: ICD-10-CM

## 2021-10-26 DIAGNOSIS — R20.2 PARESTHESIA: Primary | ICD-10-CM

## 2021-10-26 PROCEDURE — 99282 EMERGENCY DEPT VISIT SF MDM: CPT

## 2021-10-26 RX ORDER — CEFDINIR 300 MG/1
300 CAPSULE ORAL 2 TIMES DAILY
COMMUNITY
End: 2021-12-14 | Stop reason: ALTCHOICE

## 2021-10-26 RX ORDER — IBUPROFEN 600 MG/1
600 TABLET ORAL EVERY 6 HOURS PRN
Qty: 20 TABLET | Refills: 0 | OUTPATIENT
Start: 2021-10-26 | End: 2021-12-14

## 2021-10-26 ASSESSMENT — PAIN DESCRIPTION - DESCRIPTORS: DESCRIPTORS: PRESSURE;NUMBNESS

## 2021-10-26 ASSESSMENT — PAIN SCALES - GENERAL: PAINLEVEL_OUTOF10: 7

## 2021-10-26 ASSESSMENT — PAIN DESCRIPTION - PAIN TYPE: TYPE: ACUTE PAIN

## 2021-10-26 ASSESSMENT — PAIN DESCRIPTION - ORIENTATION: ORIENTATION: RIGHT

## 2021-10-26 ASSESSMENT — PAIN DESCRIPTION - LOCATION: LOCATION: FACE

## 2021-10-26 NOTE — ED PROVIDER NOTES
[facility]  Department of Emergency Medicine   ED  Encounter Note  Admit Date/RoomTime: 10/26/2021  4:37 PM  ED Room:   NAME: Paulino Petty  : 1975  MRN: [id number]     Chief Complaint:  Numbness (right upper cheek area numb since she had dental work 2 weeks ago, pt PCP put her on ATB but not helping)    HISTORY OF PRESENT ILLNESS        Aravind Giraldo is a 51 y.o. female who presents to the ED complaining of pressure and numbness to her right cheek. Patient states that she had a couple dental fillings done about 2 weeks ago. States a few days after that she noted some numbness into her right upper cheek. Facial pressure. Thought perhaps she was getting a sinus infection. Did call the dentist back and there was a concern for either dental infection or sinus infection. Was put on amoxicillin for which she took for 5 days. States it was not getting any better and the numbness seemed to have traveled from her right upper cheek to her right nose to her right lower cheek and now it is her right upper jaw towards her right temple. She describes it as a fullness, pressure and numbness. She then called her family doctor who switched her to cefdinir which she is still on. Patient states she did call her dentist again and he has set her up with an oral surgeon. Patient states pressure is a 7 out of 10. She is taken Aleve a couple times. She also took Aleve cold and sinus but does not like getting that medicine head from the pseudoephedrine. She denies any atypical headache. Denies dizziness. Denies any numbness or tingling anywhere else on her body. ROS   Pertinent positives and negatives are stated within HPI, all other systems reviewed and are negative. Past Medical History:  has no past medical history on file. Surgical History:  has a past surgical history that includes Tonsillectomy.     Social History:  reports that she has been smoking. She has been smoking about 0.50 packs per day. She has never used smokeless tobacco. She reports that she does not drink alcohol and does not use drugs. Family History: family history is not on file. Allergies: Patient has no known allergies. PHYSICAL EXAM   Oxygen Saturation Interpretation: Normal on room air analysis. ED Triage Vitals   BP Temp Temp src Pulse Resp SpO2 Height Weight   10/26/21 1632 10/26/21 1627 -- 10/26/21 1627 10/26/21 1632 10/26/21 1627 10/26/21 1632 10/26/21 1632   (!) 161/88 97.5 °F (36.4 °C)  73 18 100 % 5' 3\" (1.6 m) 161 lb (73 kg)       General:  NAD. Alert and Oriented. Well-appearing. Skin:  Warm, dry. No rashes. Head:  Normocephalic. Atraumatic. Eyes:  EOMI. Conjunctiva normal.  ENT:  Oral mucosa moist.  Airway patent. Posterior pharynx pink without erythema, without swelling, without exudate. Uvula midline with equal rise and without edema. Bilateral ear canals patent with minimal cerumen. Bilateral TM's without erythema, without bulging. Nasal turbinates with minimal swelling. Frontal and maxillary sinuses are tender to palpation. Dental: Patient does have dental #3 and 4 completely packed with a filling. I do not appreciate any visible dental abscess. Neck:  Supple. Normal ROM. Negative tender lymphadenopathy. Respiratory:  No respiratory distress. No labored breathing. Lungs clear without rales, rhonchi or wheezing. Cardiovascular:  Regular rate. No Murmur. No peripheral edema. Extremities warm and good color. Extremities:  Normal ROM. Nontender to palpation. Atraumatic. Back:  Normal ROM. Nontender to palpation. Neuro:  Alert and Oriented to person, place, time and situation. Normal LOC. Moves all extremities. Speech fluent. Equal smile. Equal puffed cheeks. Tongue is midline. Uvula with equal rise. Both eyebrows go up and equal closed eyes. Psych:  Calm and Cooperative. Normal thought process.   Normal judgement. Lab / Imaging Results   (All laboratory and radiology results have been personally reviewed by myself)  Labs:  No results found for this visit on 10/26/21. Imaging: All Radiology results interpreted by Radiologist unless otherwise noted. No orders to display       ED Course / Medical Decision Making   Medications - No data to display     Re-examination:  10/26/21       Time:   Patients condition . Consult(s):   None    Procedure(s):   none    MDM:   No evidence of Bell's palsy or TIA. No cause for CT. Already on antibiotics. I will add on a decongestant and anti-inflammatory. Plan of Care/Counseling:  Physician Assistant on duty reviewed today's visit with the patient in addition to providing specific details for the plan of care and counseling regarding the diagnosis and prognosis. Questions are answered at this time and are agreeable with the plan. ASSESSMENT     1. Paresthesia New Problem   2. Acute pansinusitis, recurrence not specified New Problem     PLAN   Discharged home. Patient condition is good    New Medications     New Prescriptions    IBUPROFEN (IBU) 600 MG TABLET    Take 1 tablet by mouth every 6 hours as needed for Pain    LORATADINE-PSEUDOEPHEDRINE (CLARITIN-D 12HR) 5-120 MG PER EXTENDED RELEASE TABLET    Take 1 tablet by mouth every 12 hours for 5 days     Electronically signed by YVROSE Garcia   DD: 10/26/21  **This report was transcribed using voice recognition software. Every effort was made to ensure accuracy; however, inadvertent computerized transcription errors may be present.   END OF ED PROVIDER NOTE       Carolynn Garcia  10/26/21 1933

## 2021-12-14 ENCOUNTER — HOSPITAL ENCOUNTER (EMERGENCY)
Age: 46
Discharge: HOME | End: 2021-12-14
Payer: COMMERCIAL

## 2021-12-14 ENCOUNTER — APPOINTMENT (OUTPATIENT)
Dept: CT IMAGING | Age: 46
End: 2021-12-14
Payer: COMMERCIAL

## 2021-12-14 VITALS
TEMPERATURE: 98 F | HEART RATE: 90 BPM | HEIGHT: 63 IN | SYSTOLIC BLOOD PRESSURE: 159 MMHG | WEIGHT: 160 LBS | OXYGEN SATURATION: 98 % | DIASTOLIC BLOOD PRESSURE: 73 MMHG | BODY MASS INDEX: 28.35 KG/M2 | RESPIRATION RATE: 16 BRPM

## 2021-12-14 DIAGNOSIS — R22.0 FACIAL SWELLING: Primary | ICD-10-CM

## 2021-12-14 LAB
ANION GAP SERPL CALCULATED.3IONS-SCNC: 10 MMOL/L (ref 7–16)
BASOPHILS ABSOLUTE: 0.08 E9/L (ref 0–0.2)
BASOPHILS RELATIVE PERCENT: 0.6 % (ref 0–2)
BUN BLDV-MCNC: 8 MG/DL (ref 6–20)
CALCIUM SERPL-MCNC: 9.1 MG/DL (ref 8.6–10.2)
CHLORIDE BLD-SCNC: 106 MMOL/L (ref 98–107)
CO2: 25 MMOL/L (ref 22–29)
CREAT SERPL-MCNC: 0.7 MG/DL (ref 0.5–1)
EOSINOPHILS ABSOLUTE: 0.08 E9/L (ref 0.05–0.5)
EOSINOPHILS RELATIVE PERCENT: 0.6 % (ref 0–6)
GFR AFRICAN AMERICAN: >60
GFR NON-AFRICAN AMERICAN: >60 ML/MIN/1.73
GLUCOSE BLD-MCNC: 99 MG/DL (ref 74–99)
HCT VFR BLD CALC: 42.1 % (ref 34–48)
HEMOGLOBIN: 14.1 G/DL (ref 11.5–15.5)
IMMATURE GRANULOCYTES #: 0.14 E9/L
IMMATURE GRANULOCYTES %: 1.1 % (ref 0–5)
LYMPHOCYTES ABSOLUTE: 2.83 E9/L (ref 1.5–4)
LYMPHOCYTES RELATIVE PERCENT: 22.4 % (ref 20–42)
MCH RBC QN AUTO: 30.9 PG (ref 26–35)
MCHC RBC AUTO-ENTMCNC: 33.5 % (ref 32–34.5)
MCV RBC AUTO: 92.1 FL (ref 80–99.9)
MONOCYTES ABSOLUTE: 0.82 E9/L (ref 0.1–0.95)
MONOCYTES RELATIVE PERCENT: 6.5 % (ref 2–12)
NEUTROPHILS ABSOLUTE: 8.67 E9/L (ref 1.8–7.3)
NEUTROPHILS RELATIVE PERCENT: 68.8 % (ref 43–80)
PDW BLD-RTO: 13.2 FL (ref 11.5–15)
PLATELET # BLD: 263 E9/L (ref 130–450)
PMV BLD AUTO: 10.8 FL (ref 7–12)
POTASSIUM REFLEX MAGNESIUM: 4.5 MMOL/L (ref 3.5–5)
RBC # BLD: 4.57 E12/L (ref 3.5–5.5)
SODIUM BLD-SCNC: 141 MMOL/L (ref 132–146)
WBC # BLD: 12.6 E9/L (ref 4.5–11.5)

## 2021-12-14 PROCEDURE — 85025 COMPLETE CBC W/AUTO DIFF WBC: CPT

## 2021-12-14 PROCEDURE — 70487 CT MAXILLOFACIAL W/DYE: CPT

## 2021-12-14 PROCEDURE — 6360000004 HC RX CONTRAST MEDICATION: Performed by: RADIOLOGY

## 2021-12-14 PROCEDURE — 80048 BASIC METABOLIC PNL TOTAL CA: CPT

## 2021-12-14 PROCEDURE — 70450 CT HEAD/BRAIN W/O DYE: CPT

## 2021-12-14 PROCEDURE — 99283 EMERGENCY DEPT VISIT LOW MDM: CPT

## 2021-12-14 RX ORDER — NAPROXEN 500 MG/1
500 TABLET ORAL 2 TIMES DAILY PRN
Qty: 14 TABLET | Refills: 0 | Status: SHIPPED | OUTPATIENT
Start: 2021-12-14 | End: 2022-02-17

## 2021-12-14 RX ORDER — NAPROXEN 500 MG/1
500 TABLET ORAL 2 TIMES DAILY PRN
Qty: 14 TABLET | Refills: 0 | Status: SHIPPED | OUTPATIENT
Start: 2021-12-14 | End: 2021-12-14 | Stop reason: SDUPTHER

## 2021-12-14 RX ORDER — FLUCONAZOLE 150 MG/1
150 TABLET ORAL ONCE
Qty: 1 TABLET | Refills: 0 | Status: SHIPPED | OUTPATIENT
Start: 2021-12-14 | End: 2021-12-14

## 2021-12-14 RX ORDER — AMOXICILLIN AND CLAVULANATE POTASSIUM 875; 125 MG/1; MG/1
1 TABLET, FILM COATED ORAL 2 TIMES DAILY
Qty: 14 TABLET | Refills: 0 | Status: SHIPPED | OUTPATIENT
Start: 2021-12-14 | End: 2021-12-14 | Stop reason: SDUPTHER

## 2021-12-14 RX ORDER — AMOXICILLIN AND CLAVULANATE POTASSIUM 875; 125 MG/1; MG/1
1 TABLET, FILM COATED ORAL 2 TIMES DAILY
Qty: 14 TABLET | Refills: 0 | Status: SHIPPED | OUTPATIENT
Start: 2021-12-14 | End: 2021-12-21

## 2021-12-14 RX ADMIN — IOPAMIDOL 75 ML: 755 INJECTION, SOLUTION INTRAVENOUS at 12:09

## 2021-12-14 NOTE — Clinical Note
Yoseph Ruano was seen and treated in our emergency department on 12/14/2021. She may return to work on 12/15/2021. If you have any questions or concerns, please don't hesitate to call.       Howard Salcedo PA-C

## 2021-12-14 NOTE — Clinical Note
Kayla Scruggs was seen and treated in our emergency department on 12/14/2021. She may return to work on 12/15/2021. If you have any questions or concerns, please don't hesitate to call.       Rock Márquez PA-C

## 2021-12-14 NOTE — ED PROVIDER NOTES
[facility]     Department of Emergency Medicine   ED  Encounter Note  Admit Date/RoomTime: 2021  2:44 PM  ED Room: [place] [address]    NAME: Elizabet Deng  : 1975  MRN: [id number]     Chief Complaint:  Facial Pain (right side) and Facial Swelling (right side)    History of Present Illness        Elizabet Deng is a 51 y.o. old female who presents to the emergency department by private vehicle, for non-traumatic right upper facial pain, which occured 3 week(s) prior to arrival.  Patient states that she had more tooth extraction 3 weeks ago and has been experiencing pain and numbness since that time. Became concerned when she developed right-sided facial swelling over the last few days. Since onset the symptoms have been persistent and gradually worsening and moderate in severity. Worsened by  chewing and improved by nothing. Associated Signs & Symptoms:  nothing additional.           Onset:       Spontaneous:   no.     Following Trauma:   no.     Previous Caries:   yes. Recent Dental Procedure:   yes. ROS   Pertinent positives and negatives are stated within HPI, all other systems reviewed and are negative. Past Medical History:  has no past medical history on file. Surgical History:  has a past surgical history that includes Tonsillectomy. Social History:  reports that she has been smoking. She has been smoking about 0.50 packs per day. She has never used smokeless tobacco. She reports that she does not drink alcohol and does not use drugs. Family History: family history is not on file. Allergies: Patient has no known allergies.     Physical Exam   Oxygen Saturation Interpretation: Normal.        ED Triage Vitals   BP Temp Temp Source Pulse Resp SpO2 Height Weight   21 0939 21 0939 21 0939 21 0939 21 0939 21 0939 21 1035 21 1035   132/75 98.2 °F (36.8 °C) Temporal 111 18 98 % 5' 3\" (1.6 m) 160 lb (72.6 kg) · Constitutional:  Alert, development consistent with age. · HEENT:  NC/NT. Airway patent. · Neck:  Supple. Normal ROM. · Lips:  upper and lower normal.  · Mouth:  normal tongue and buccal mucosa. · Dental: Well-healing molar extractions. No gingival swelling or fluctuance to suggest abscess. No sublingual or submandibular tenderness. Uvula midline. Airway patent. .                    Trismus: No.         Drooling: No.           Airway stridor: No.  · Facial skin:  right swelling and minimal tenderness. · Respiratory:  Clear to auscultation and breath sounds equal.    · CV: Regular rate and rhythm, normal heart sounds, without pathological murmurs, ectopy, gallops, or rubs. · Skin:  No rashes, erythema or lesions present, unless noted elsewhere. .  · Lymphatics: No lymphangitis or adenopathy noted. · Neurological:  Oriented. Motor functions intact.     Lab / Imaging Results   (All laboratory and radiology results have been personally reviewed by myself)  Labs:  Results for orders placed or performed during the hospital encounter of 12/14/21   CBC Auto Differential   Result Value Ref Range    WBC 12.6 (H) 4.5 - 11.5 E9/L    RBC 4.57 3.50 - 5.50 E12/L    Hemoglobin 14.1 11.5 - 15.5 g/dL    Hematocrit 42.1 34.0 - 48.0 %    MCV 92.1 80.0 - 99.9 fL    MCH 30.9 26.0 - 35.0 pg    MCHC 33.5 32.0 - 34.5 %    RDW 13.2 11.5 - 15.0 fL    Platelets 974 233 - 804 E9/L    MPV 10.8 7.0 - 12.0 fL    Neutrophils % 68.8 43.0 - 80.0 %    Immature Granulocytes % 1.1 0.0 - 5.0 %    Lymphocytes % 22.4 20.0 - 42.0 %    Monocytes % 6.5 2.0 - 12.0 %    Eosinophils % 0.6 0.0 - 6.0 %    Basophils % 0.6 0.0 - 2.0 %    Neutrophils Absolute 8.67 (H) 1.80 - 7.30 E9/L    Immature Granulocytes # 0.14 E9/L    Lymphocytes Absolute 2.83 1.50 - 4.00 E9/L    Monocytes Absolute 0.82 0.10 - 0.95 E9/L    Eosinophils Absolute 0.08 0.05 - 0.50 E9/L    Basophils Absolute 0.08 0.00 - 0.20 D2/Y   Basic Metabolic Panel w/ Reflex to MG   Result Value Ref Range    Sodium 141 132 - 146 mmol/L    Potassium reflex Magnesium 4.5 3.5 - 5.0 mmol/L    Chloride 106 98 - 107 mmol/L    CO2 25 22 - 29 mmol/L    Anion Gap 10 7 - 16 mmol/L    Glucose 99 74 - 99 mg/dL    BUN 8 6 - 20 mg/dL    CREATININE 0.7 0.5 - 1.0 mg/dL    GFR Non-African American >60 >=60 mL/min/1.73    GFR African American >60     Calcium 9.1 8.6 - 10.2 mg/dL     Imaging: All Radiology results interpreted by Radiologist unless otherwise noted. CT HEAD WO CONTRAST   Final Result   Head CT:      No acute CVA, intracranial bleed, or brain mass. Facial CT:      No definite CT evidence of inflammation or abscess. No acute fracture      Suggestion of prior right maxillary molar extractions         CT FACIAL BONES W CONTRAST   Final Result   Head CT:      No acute CVA, intracranial bleed, or brain mass. Facial CT:      No definite CT evidence of inflammation or abscess. No acute fracture      Suggestion of prior right maxillary molar extractions           ED Course / Medical Decision Making     Medications   iopamidol (ISOVUE-370) 76 % injection 75 mL (75 mLs IntraVENous Given 12/14/21 1209)        Consult(s):   Dental Resident was not consulted to see patient regarding complaint. Procedure(s):   None    Plan of Care/Counseling:  Marguerite Frankel, PA-C reviewed today's visit with the patient in addition to providing specific details for the plan of care and counseling regarding the diagnosis and prognosis. Questions are answered at this time and are agreeable with the plan. Assessment      1. Facial swelling      Plan   Discharged home.   Patient condition is good    New Medications     Discharge Medication List as of 12/14/2021  3:58 PM      START taking these medications    Details   fluconazole (DIFLUCAN) 150 MG tablet Take 1 tablet by mouth once for 1 dose, Disp-1 tablet, R-0Print           Electronically signed by Marguerite Frankel, PA-C   DD: 12/14/21  **This report was transcribed using voice recognition software. Every effort was made to ensure accuracy; however, inadvertent computerized transcription errors may be present.   END OF ED PROVIDER NOTE        Flavia Mai PA-C  12/14/21 [id number]

## 2022-02-12 ENCOUNTER — HOSPITAL ENCOUNTER (EMERGENCY)
Age: 47
Discharge: HOME | End: 2022-02-12
Attending: EMERGENCY MEDICINE
Payer: COMMERCIAL

## 2022-02-12 ENCOUNTER — APPOINTMENT (OUTPATIENT)
Dept: GENERAL RADIOLOGY | Age: 47
End: 2022-02-12
Payer: COMMERCIAL

## 2022-02-12 VITALS
OXYGEN SATURATION: 100 % | RESPIRATION RATE: 18 BRPM | TEMPERATURE: 98.8 F | HEART RATE: 86 BPM | DIASTOLIC BLOOD PRESSURE: 84 MMHG | SYSTOLIC BLOOD PRESSURE: 128 MMHG

## 2022-02-12 DIAGNOSIS — S82.841A CLOSED BIMALLEOLAR FRACTURE OF RIGHT ANKLE, INITIAL ENCOUNTER: ICD-10-CM

## 2022-02-12 PROCEDURE — 99283 EMERGENCY DEPT VISIT LOW MDM: CPT

## 2022-02-12 PROCEDURE — 73560 X-RAY EXAM OF KNEE 1 OR 2: CPT

## 2022-02-12 PROCEDURE — 73610 X-RAY EXAM OF ANKLE: CPT

## 2022-02-12 PROCEDURE — 96372 THER/PROPH/DIAG INJ SC/IM: CPT

## 2022-02-12 PROCEDURE — 73630 X-RAY EXAM OF FOOT: CPT

## 2022-02-12 PROCEDURE — 6360000002 HC RX W HCPCS: Performed by: EMERGENCY MEDICINE

## 2022-02-12 PROCEDURE — 29515 APPLICATION SHORT LEG SPLINT: CPT

## 2022-02-12 RX ORDER — KETOROLAC TROMETHAMINE 30 MG/ML
30 INJECTION, SOLUTION INTRAMUSCULAR; INTRAVENOUS ONCE
Status: COMPLETED | OUTPATIENT
Start: 2022-02-12 | End: 2022-02-12

## 2022-02-12 RX ORDER — HYDROCODONE BITARTRATE AND ACETAMINOPHEN 5; 325 MG/1; MG/1
1 TABLET ORAL EVERY 4 HOURS PRN
Qty: 18 TABLET | Refills: 0 | Status: SHIPPED | OUTPATIENT
Start: 2022-02-12 | End: 2022-02-15

## 2022-02-12 RX ADMIN — KETOROLAC TROMETHAMINE 30 MG: 30 INJECTION, SOLUTION INTRAMUSCULAR at 16:08

## 2022-02-12 ASSESSMENT — PAIN DESCRIPTION - LOCATION: LOCATION: ANKLE

## 2022-02-12 ASSESSMENT — PAIN SCALES - GENERAL
PAINLEVEL_OUTOF10: 3
PAINLEVEL_OUTOF10: 8
PAINLEVEL_OUTOF10: 10

## 2022-02-12 ASSESSMENT — PAIN DESCRIPTION - PAIN TYPE
TYPE: ACUTE PAIN
TYPE: ACUTE PAIN

## 2022-02-12 ASSESSMENT — PAIN DESCRIPTION - ORIENTATION: ORIENTATION: RIGHT

## 2022-02-12 NOTE — ED PROVIDER NOTES
HPI:  2/12/22,   Time: 3:59 PM ANATOLY Burciaga is a 51 y.o. female presenting to the ED for right foot and ankle pain, beginning 1 hour ago. The complaint has been persistent, mild in severity, and worsened by nothing. Patient says that she was walking down stairs when she slipped and fell. No head injury or LOC. Having right lateral ankle pain. Sharp and stabbing pain. Worse with some types of movement. Has not tried to ambulate because of the pain. No alleviating factors. Did take some motrin without relief. No numbness, tingling, weakness. ROS:   Pertinent positives and negatives are stated within HPI, all other systems reviewed and are negative.  --------------------------------------------- PAST HISTORY ---------------------------------------------  Past Medical History:  has no past medical history on file. Past Surgical History:  has a past surgical history that includes Tonsillectomy. Social History:  reports that she has been smoking. She has been smoking about 0.50 packs per day. She has never used smokeless tobacco. She reports that she does not drink alcohol and does not use drugs. Family History: family history is not on file. The patients home medications have been reviewed. Allergies: Patient has no known allergies. -------------------------------------------------- RESULTS -------------------------------------------------  All laboratory and radiology results have been personally reviewed by myself   LABS:  No results found for this visit on 02/12/22. RADIOLOGY:  Interpreted by Radiologist.  XR KNEE RIGHT (1-2 VIEWS)   Final Result   No acute abnormality of the knee. XR FOOT RIGHT (MIN 3 VIEWS)   Final Result   No acute foot fracture is identified.          XR ANKLE RIGHT (MIN 3 VIEWS)   Final Result   Bimalleolar ankle fracture.             ------------------------- NURSING NOTES AND VITALS REVIEWED ---------------------------   The nursing notes within the ED encounter and vital signs as below have been reviewed. /82   Pulse 97   Temp 98.8 °F (37.1 °C) (Temporal)   Resp 20   SpO2 98%   Oxygen Saturation Interpretation: Normal      ---------------------------------------------------PHYSICAL EXAM--------------------------------------      Constitutional/General: Alert and oriented x3, well appearing, non toxic in NAD  Head: NC/AT  Eyes: PERRL, EOMI  Mouth: Oropharynx clear, handling secretions, no trismus  Neck: Supple, full ROM, no meningeal signs  Pulmonary: Lungs clear to auscultation bilaterally, no wheezes, rales, or rhonchi. Not in respiratory distress  Cardiovascular:  Regular rate and rhythm, no murmurs, gallops, or rubs. 2+ distal pulses  Abdomen: Soft, non tender, non distended   Extremities: Moves all extremities x 4. Warm and well perfused. Lateral malleolus tenderness to palpation on right ankle. 2+ DP/PT pulses  Skin: warm and dry without rash  Neurologic: GCS 15, CN 2-12 intact. Psych: Normal Affect      ------------------------------ ED COURSE/MEDICAL DECISION MAKING----------------------  Medications   ketorolac (TORADOL) injection 30 mg (30 mg IntraMUSCular Given 2/12/22 1608)         Medical Decision Making:    XR of right knee and R foot were normal. XR R ankle remarkable for bimalleolar ankle fracture. I spoke with the orthopedic surgery resident. He recommends 3 sided splint and following up outpatient. Splint applied. Patient is neurovascular intact after application. I told the patient to take medication prescribed as directed, to follow up with orthopedic surgery this week, and to return for worsening symptoms. She's agreeable with the plan of care. Counseling: The emergency provider has spoken with the patient and discussed todays results, in addition to providing specific details for the plan of care and counseling regarding the diagnosis and prognosis.   Questions are answered at this time and they are agreeable with the plan.      --------------------------------- IMPRESSION AND DISPOSITION ---------------------------------    IMPRESSION  1.  Closed bimalleolar fracture of right ankle, initial encounter        DISPOSITION  Disposition: Discharge to home  Patient condition is stable                  Shorty Wagner MD  02/12/22 [id number]

## 2022-02-14 ENCOUNTER — TELEPHONE (OUTPATIENT)
Dept: ADMINISTRATIVE | Age: 47
End: 2022-02-14

## 2022-02-17 ENCOUNTER — OFFICE VISIT (OUTPATIENT)
Dept: ORTHOPEDIC SURGERY | Age: 47
End: 2022-02-17
Payer: COMMERCIAL

## 2022-02-17 VITALS — WEIGHT: 165 LBS | TEMPERATURE: 98 F | HEIGHT: 63 IN | BODY MASS INDEX: 29.23 KG/M2

## 2022-02-17 DIAGNOSIS — S82.831A CLOSED FRACTURE OF DISTAL END OF RIGHT FIBULA, UNSPECIFIED FRACTURE MORPHOLOGY, INITIAL ENCOUNTER: Primary | ICD-10-CM

## 2022-02-17 PROCEDURE — 99024 POSTOP FOLLOW-UP VISIT: CPT | Performed by: ORTHOPAEDIC SURGERY

## 2022-02-17 PROCEDURE — 27786 TREATMENT OF ANKLE FRACTURE: CPT | Performed by: ORTHOPAEDIC SURGERY

## 2022-02-17 RX ORDER — CYCLOBENZAPRINE HCL 5 MG
TABLET ORAL
COMMUNITY
Start: 2021-11-11

## 2022-02-17 RX ORDER — HYDROCODONE BITARTRATE AND ACETAMINOPHEN 5; 325 MG/1; MG/1
1 TABLET ORAL EVERY 6 HOURS PRN
Qty: 28 TABLET | Refills: 0 | Status: SHIPPED | OUTPATIENT
Start: 2022-02-17 | End: 2022-02-24

## 2022-02-17 RX ORDER — OMEPRAZOLE 40 MG/1
40 CAPSULE, DELAYED RELEASE ORAL DAILY
COMMUNITY
Start: 2021-12-30

## 2022-02-17 RX ORDER — HYDROXYZINE HYDROCHLORIDE 25 MG/1
TABLET, FILM COATED ORAL
COMMUNITY
Start: 2021-12-22

## 2022-02-17 NOTE — PROGRESS NOTES
Brea Brady is a 51y.o. year old female who presents today for evaluation of a right bimalleolar  injury which occurred on 2/12/22. The patient reports that this injury occurred when she fell down the stairs. The patient denies any other injuries. Movement makes the pain worse, the splint and resting makes the pain better. The patient's past medical history, medications, and review of systems was reviewed. On Physical Exam, Brea Brady is well-developed, well-nourished, and oriented to person, place and time. her gait is intact. On evaluation of her right lower extremity, there is not obvious deformity. There is swelling and is ecchymosis. she is tender to palpation over the medial and lateral malleolas, and otherwise nontender over the remainder of the extremity. Range of motion is decreased secondary to pain over the right ankle. The skin overlying the right ankle is intact without evidence of lesion, laceration or abrasion. Distal pulses are 2+ and symmetric bilaterally. Sensation is grossly intact to light touch and symmetric bilaterally. Xrays:  Xrays dated 2/12/22 were reviewed.         Impression:  right bimalleolar Fracture    Plan:   RICE therapy  Boot  F/u 1 wk for xray

## 2022-02-21 DIAGNOSIS — S82.841A BIMALLEOLAR ANKLE FRACTURE, RIGHT, CLOSED, INITIAL ENCOUNTER: Primary | ICD-10-CM

## 2022-02-22 ENCOUNTER — TELEPHONE (OUTPATIENT)
Dept: ORTHOPEDIC SURGERY | Age: 47
End: 2022-02-22

## 2022-02-22 ENCOUNTER — OFFICE VISIT (OUTPATIENT)
Dept: ORTHOPEDIC SURGERY | Age: 47
End: 2022-02-22
Payer: COMMERCIAL

## 2022-02-22 VITALS — HEIGHT: 63 IN | WEIGHT: 165 LBS | BODY MASS INDEX: 29.23 KG/M2

## 2022-02-22 DIAGNOSIS — Z20.822 ENCOUNTER FOR PREOPERATIVE SCREENING LABORATORY TESTING FOR COVID-19 VIRUS: ICD-10-CM

## 2022-02-22 DIAGNOSIS — Z01.812 ENCOUNTER FOR PREOPERATIVE SCREENING LABORATORY TESTING FOR COVID-19 VIRUS: ICD-10-CM

## 2022-02-22 DIAGNOSIS — S82.841A BIMALLEOLAR ANKLE FRACTURE, RIGHT, CLOSED, INITIAL ENCOUNTER: ICD-10-CM

## 2022-02-22 DIAGNOSIS — S82.841A BIMALLEOLAR ANKLE FRACTURE, RIGHT, CLOSED, INITIAL ENCOUNTER: Primary | ICD-10-CM

## 2022-02-22 PROBLEM — Z11.52 ENCOUNTER FOR PREOPERATIVE SCREENING LABORATORY TESTING FOR COVID-19 VIRUS: Status: ACTIVE | Noted: 2022-02-22

## 2022-02-22 PROCEDURE — 99214 OFFICE O/P EST MOD 30 MIN: CPT | Performed by: ORTHOPAEDIC SURGERY

## 2022-02-22 NOTE — PROGRESS NOTES
Soraya Fritz is a 51y.o. year old female who presents today for follow up evaluation of a right bimalleolar  injury which occurred on 2/12/22. The patient reports that this injury occurred when she fell down the stairs. The patient denies any other injuries. She is doing well with boot immobilization. She is non weight bearing at this time and uses a knee scooter for ambulation. History reviewed. No pertinent past medical history. Past Surgical History:   Procedure Laterality Date    TONSILLECTOMY       Current Outpatient Medications   Medication Sig Dispense Refill    cyclobenzaprine (FLEXERIL) 5 MG tablet TAKE 1 TABLET BY MOUTH EVERY DAY AS NEEDED      hydrOXYzine (ATARAX) 25 MG tablet TAKE 1 TABLET BY MOUTH EVERY 8 HOURS FOR 10 DAYS AS NEEDED      omeprazole (PRILOSEC) 40 MG delayed release capsule TAKE 1 CAPSULE BY MOUTH EVERY DAY      HYDROcodone-acetaminophen (NORCO) 5-325 MG per tablet Take 1 tablet by mouth every 6 hours as needed for Pain for up to 7 days. Intended supply: 7 days. Take lowest dose possible to manage pain 28 tablet 0     No current facility-administered medications for this visit. The patient's past medical history, medications, and review of systems was reviewed. _Ht 5' 3\" (1.6 m)   Wt 165 lb (74.8 kg)   BMI 29.23 kg/m²  Vital signs are stable. In general, patient is awake, alert and oriented X3, in no apparent distress. Examination of HENT reveals normocephalic, atraumatic. PERRLA/EOMI sclera are white. Conjunctivae are clear. TM's are intact. Pharynx is pink and moist.  Uvula and tongue are midline. Heart: Positive S1 and positive S2 with regular rate and rhythm. Lungs: Clear to auscultation bilaterally without rales, rhonchi or wheezes. Abdomen: soft, nontender. Positive bowel sounds. No organomegaly. No guarding or rigidity. On Physical Exam, Soraya Fritz is well-developed, well-nourished, and oriented to person, place and time. her gait is intact. On evaluation of her right lower extremity, there is not obvious deformity. There is swelling and is ecchymosis. she is tender to palpation over the medial and lateral malleoli, and otherwise nontender over the remainder of the extremity. Range of motion is decreased secondary to pain over the right ankle. The skin overlying the right ankle is intact without evidence of lesion, laceration or abrasion. Distal pulses are 2+ and symmetric bilaterally. Sensation is grossly intact to light touch and symmetric bilaterally. Xrays:    Stable alignment of lateral malleolar fracture with no widening of the ankle   mortise and stable mild adjacent soft tissue swelling.  Tiny avulsions at the   medial malleolus are of indeterminate acuity.  There is a plantar heel spur. Impression:    Encounter Diagnoses   Name Primary?  Bimalleolar ankle fracture, right, closed, initial encounter Yes    Encounter for preoperative screening laboratory testing for COVID-19 virus          Plan:   I discussed the treatment options with the patient and her . I suggest performing surgery and the patient agrees. I discussed the risks and benefits of the procedure with the patient. The risks include but are not limited to: infection, injuries to blood vessels and nerves, non relief of symptoms, need for further operative intervention, blood loss,  DVT/PE, MI and death. The patient understands these risks and wishes to proceed with surgery. I will perform an ORIF of the right distal fibula on 02/25/2022. The patient was counseled at length about the risks of shlomo Covid-19 during their perioperative period and any recovery window from their procedure. The patient was made aware that shlomo Covid-19  may worsen their prognosis for recovering from their procedure  and lend to a higher morbidity and/or mortality risk.   All material risks, benefits, and reasonable alternatives including postponing the procedure were discussed. The patient does wish to proceed with the procedure at this time. At least 30 minutes was spent discussing the diagnosis and treatment options with the patient with at least 50% of the time was spent with decision making and counseling the patient.

## 2022-02-22 NOTE — TELEPHONE ENCOUNTER
Prior Authorization Form:      DEMOGRAPHICS:                     Patient Name:  Franklin Mckee  Patient :  1975            Insurance:  Payor: [facility] / Plan: [address] [place] [address] / Product Type: *No Product type* /   Insurance ID Number:    Payor/Plan Subscr  Sex Relation Sub. Ins. ID Effective Group Num   1. [address] [facility] [birth year]1971 Male Spouse [id number]                                     [id number]         DIAGNOSIS & PROCEDURE:                       Procedure/Operation: Right ankle open reduction internal fixation distal fibula fracture           CPT Code: 34039    Diagnosis:  Right distal fibular fracture    ICD10 Code: S82.841A    Location:  [facility] surgery [facility]    Surgeon:   Raad Ward    SCHEDULING INFORMATION:                          Date: 22    Time: to Follow              Anesthesia:  General                                                       Status:  Outpatient        Special Comments:  Jen Saldivar       Electronically signed by Laisha Kessler ATC on 2022 at 11:06 AM

## 2022-02-23 LAB — SARS-COV-2, PCR: DETECTED

## 2022-02-28 ENCOUNTER — TELEPHONE (OUTPATIENT)
Dept: ORTHOPEDIC SURGERY | Age: 47
End: 2022-02-28

## 2022-02-28 ENCOUNTER — OFFICE VISIT (OUTPATIENT)
Dept: ORTHOPEDIC SURGERY | Age: 47
End: 2022-02-28
Payer: COMMERCIAL

## 2022-02-28 VITALS — WEIGHT: 165 LBS | HEIGHT: 63 IN | TEMPERATURE: 98 F | BODY MASS INDEX: 29.23 KG/M2

## 2022-02-28 DIAGNOSIS — S82.841A BIMALLEOLAR ANKLE FRACTURE, RIGHT, CLOSED, INITIAL ENCOUNTER: Primary | ICD-10-CM

## 2022-02-28 PROCEDURE — 99213 OFFICE O/P EST LOW 20 MIN: CPT | Performed by: NURSE PRACTITIONER

## 2022-02-28 NOTE — PROGRESS NOTES
Oneyda Shine is a 51y.o. year old female who presents today for evaluation of a right bimalleolar  injury which occurred on 2/12/22. The patient reports that this injury occurred when she fell down the stairs. She has been nwb and cam walker boot. She potentially was going to have surgery but was cancelled due to [facility] protocol, pt asymptomatic, unvaccinated and tested positive for covid on 2/22/22, therefore her surgery was cancelled. She is here today to check alignment of fracture. Patient was upset during appointment asking if she should [facility] the hospital if she has issues down the road. The patient's past medical history, medications, and review of systems was reviewed. On Physical Exam, Oneyda Shine is well-developed, well-nourished, and oriented to person, place and time. her gait is intact. On evaluation of her right lower extremity, there is not obvious deformity. There is swelling and is no ecchymosis. she is tender to palpation over the medial and lateral malleolas, and otherwise nontender over the remainder of the extremity. Range of motion is decreased secondary to pain over the right ankle. The skin overlying the right ankle is intact without evidence of lesion, laceration or abrasion. Distal pulses are 2+ and symmetric bilaterally. Sensation is grossly intact to light touch and symmetric bilaterally. Xrays:    Lateral malleolar fracture alignment is stable.  No widening of the mortise. Stable avulsion at the medial malleolus.  Plantar heel spur.  Stable soft   tissue swelling. Impression:  right bimalleolar Fracture    Plan:   Fracture stable with no change in alignment. cam walker boot  nwb  I explained to the patient that there is no guarantee that she wont have issues with her ankle down the road wether or not she has surgery, anytime you break a bone there is risk of chronic pain and arthritis.   Discussed with patient she is entitled to second opinion if she does not agree with our treatment plan. otherwise she will continue to be treated conservatively and follow up in 3 weeks with xr.

## 2022-02-28 NOTE — TELEPHONE ENCOUNTER
On 2/24/22 our office was notified that the patient tested positive for Covid 19. Per [address] [place] [address] Drive and guidelines, the patient is to be cancelled and is eligible to rescheduled for surgery in 4 weeks. The patient was notified and voiced understanding.

## 2022-03-24 DIAGNOSIS — S82.841A BIMALLEOLAR ANKLE FRACTURE, RIGHT, CLOSED, INITIAL ENCOUNTER: Primary | ICD-10-CM

## 2022-03-25 ENCOUNTER — OFFICE VISIT (OUTPATIENT)
Dept: ORTHOPEDIC SURGERY | Age: 47
End: 2022-03-25

## 2022-03-25 VITALS — BODY MASS INDEX: 29.23 KG/M2 | HEIGHT: 63 IN | WEIGHT: 165 LBS

## 2022-03-25 DIAGNOSIS — S82.841A BIMALLEOLAR ANKLE FRACTURE, RIGHT, CLOSED, INITIAL ENCOUNTER: Primary | ICD-10-CM

## 2022-03-25 PROCEDURE — 99024 POSTOP FOLLOW-UP VISIT: CPT | Performed by: NURSE PRACTITIONER

## 2022-03-25 RX ORDER — IBUPROFEN 800 MG/1
800 TABLET ORAL EVERY 8 HOURS PRN
Qty: 90 TABLET | Refills: 3 | Status: SHIPPED
Start: 2022-03-25 | End: 2022-07-29

## 2022-03-25 NOTE — PROGRESS NOTES
Barbie Johnson is a 51y.o. year old female who presents today for evaluation of a right bimalleolar  injury which occurred on 2/12/22. The patient reports that this injury occurred when she fell down the stairs. She has been nwb and cam walker boot. She potentially was going to have surgery but was cancelled due to [facility] protocol, pt asymptomatic, unvaccinated and tested positive for covid on 2/22/22. She has been nwb in cam walker boot. She has pain controlled with ibuprofen. The patient's past medical history, medications, and review of systems was reviewed. On Physical Exam, Barbie Johnson is well-developed, well-nourished, and oriented to person, place and time. her gait is intact. On evaluation of her right lower extremity, there is not obvious deformity. There is swelling and is no ecchymosis. she is tender to palpation over the lateral malleolus, and otherwise nontender over the remainder of the extremity. Range of motion is decreased secondary to pain over the right ankle. The skin overlying the right ankle is intact without evidence of lesion, laceration or abrasion. Distal pulses are 2+ and symmetric bilaterally. Sensation is grossly intact to light touch and symmetric bilaterally. Xrays:    healing bimalleolar fracture      Impression:     Diagnosis Orders   1.  Bimalleolar ankle fracture, right, closed, initial encounter  [facility]:   Start 50% weight bearing in boot, advance to fwb in boot then dc  aso  Pt  Hep  IBU  Fu in 2 months with xr

## 2022-04-01 ENCOUNTER — EVALUATION (OUTPATIENT)
Dept: PHYSICAL THERAPY | Age: 47
End: 2022-04-01
Payer: COMMERCIAL

## 2022-04-01 DIAGNOSIS — S82.841A BIMALLEOLAR ANKLE FRACTURE, RIGHT, CLOSED, INITIAL ENCOUNTER: Primary | ICD-10-CM

## 2022-04-01 PROCEDURE — 97110 THERAPEUTIC EXERCISES: CPT | Performed by: PHYSICAL THERAPIST

## 2022-04-01 PROCEDURE — 97162 PT EVAL MOD COMPLEX 30 MIN: CPT | Performed by: PHYSICAL THERAPIST

## 2022-04-01 NOTE — PROGRESS NOTES
disease)      Past Surgical History:   Procedure Laterality Date    ENDOSCOPY, COLON, DIAGNOSTIC      TONSILLECTOMY         Medications:   Current Outpatient Medications   Medication Sig Dispense Refill    ibuprofen (ADVIL;MOTRIN) 800 MG tablet Take 1 tablet by mouth every 8 hours as needed for Pain 90 tablet 3    cyclobenzaprine (FLEXERIL) 5 MG tablet TAKE 1 TABLET BY MOUTH EVERY DAY AS NEEDED      hydrOXYzine (ATARAX) 25 MG tablet TAKE 1 TABLET BY MOUTH EVERY 8 HOURS FOR 10 DAYS AS NEEDED      omeprazole (PRILOSEC) 40 MG delayed release capsule Take 40 mg by mouth daily AM       No current facility-administered medications for this visit. Patient Goals: pain relief, return to prior activity, full use of leg    Precautions/Contraindications: Start 50% weight bearing in boot, advance to fwb in boot then dc boot and use aso    OBJECTIVE:     Estimated body mass index is 29.23 kg/m² as calculated from the following:    Height as of 3/25/22: 5' 3\" (1.6 m). Weight as of 3/25/22: 165 lb (74.8 kg). Observations: well nourished female, normal affect    Inspection: normal orthopedic exam    Edema: mild     Gait: limp R LE, ambulates with crutches, with boot    Joint/Motion:    Ankle:  Lacking 10 degrees in all directions     Strength:  3/5 R foot and ankle      Palpation: Non-tender to palpation  . Special Tests/Functional Screens:  None performed   [] Anterior Drawer []+ / [] -  [] Eversion Stress: []+ / [] -  [] Feldman Test []+ / [] -     [] Tib-Fib Compression Test []+ / [] -    [] Inversion Stress []+ / [] -     [] Squeeze Test []+ / [] -   [] Cassidy's Sign []+ / [] -   [] Other: []+ / []        ASSESSMENT     Aravind is 7 weeks post injury. Treatment will be progressive weightbearing, stretching, strengthening.       Problems:   Pain 2/10 constant  Edema mild  ROM decreased  Strength decreased   Limitations with walking, stairs, work, ADLs , IADLs , use of right lower extremity, inability to participate in exercise regimen / fitness program    [x] There are no barriers affecting plan of care or recovery    [] Barriers to this patient's plan of care or recovery include:     Domestic Concerns:  [x] No  [] Yes:    Short Term goals (3 weeks)   Pain 0/10   ROM WFL   No edema   Strength 4/5    Able to perform / complete the following functions / tasks: ADLs, progress assistive device, return to exercise regimen     Long Term goals (6 weeks)   Pain 0/10   ROM full   Strength 5/5   Able to perform / complete the following functions / tasks: IADLs, normal gait, normal stair negotiation    Independent with home exercise program (HEP)    Rehab Potential: [x] Good  [] Fair  [] Poor    PLAN       Physical therapy plan of care is established based on physician order, patient diagnosis, and clinical assessment. Treatment Plan:   instruction in home exercise program   therapeutic exercise   therapeutic activity   neuromuscular re-education   gait training   balance training   proprioceptive training     The following CPT codes are likely to be used in the care of this patient:   61441 PT Evaluation: Moderate Complexity   91352 PT Re-Evaluation   50777 Therapeutic Exercise   71856 Neuromuscular Re-Education   [phone] Therapeutic Activities     Suggested Professional Referral: [x] No  [] Yes:     Patient Education:  [x] Plans / Goals, Risks / Benefits discussed  [x] Home exercise program  Method of Education: [x] Verbal  [x] Demo  [x] Written  Comprehension of Education:  [x] Verbalizes understanding. [x] Demonstrates understanding. [] Needs Review. [] Demonstrates / verbalizes understanding of HEP/Ed previously given. Frequency: 1 day per week for 6 weeks    Patient understands diagnosis/prognosis and consents to treatment, plan and goals: [x] Yes    [] No     Thank you for the opportunity to work with your patient.   If you have questions or comments, please contact me at [phone]; fax: [phone]. Electronically signed by: Sybil Powell, PT         Please sign Physician's Certification and return to: [address] PHYSICAL THERAPY  [address] [address]  Dept: [phone]  Dept Fax: [phone]: [phone] Certification / Comments     Frequency/Duration 1 day per week for 6 weeks. Certification period from 4/1/2022 to 7/1/2022. I have reviewed the Plan of Care established for skilled therapy services and certify that the services are required and that they will be provided while the patient is under my care.     Physician's Comments/Revisions:               Physician's Printed Name:                                           [de-identified] Signature:                                                               Date:

## 2022-04-01 NOTE — PROGRESS NOTES
Physical Therapy Daily Treatment Note    Date: 2022  Patient Name: Mayuri Powers  : 1975   MRN: [id number]  DOInjury: 2022   DOSx: --  Referring Provider: Ricco Bullard, APRN - [facility] Diagnosis:      Diagnosis Orders   1. Bimalleolar ankle fracture, right, closed, initial encounter         Aravind is 7 weeks post injury. Treatment will be progressive weightbearing, stretching, strengthening. Access Code: [facility]  URL: [email or web address]  Date: 2022  Prepared by: Ricardo Palencia    Exercises  Ball Rolling - 2 x daily - 2 sets - 20 reps  Standing Ankle Plantarflexion Stretch - 2 x daily - 3 reps - 30 sec hold  Gastroc Stretch on Wall - 2 x daily - 3 reps - 10-30 sec hold      Precautions/Contraindications: Start 50% weight bearing in boot, advance to fwb in boot then dc boot and use aso    X = TO BE PERFORMED NEXT VISIT  > = PROGRESS TO THIS    S: See eval  O:   Time 0430-2676     Visit   1/wk Repeat outcome measure at mid point and end. Pain Pain 2/10     ROM Lacking 10 deg all dir     Modalities         MO   Manual            Stretch      Wall DF 3 x 30 sec  TE   Standing PF stretch with chair  3 x 30 sec  TE   Exercise      Ball / can rolling X 40     BAPS   TE      TE      TE      TE      TE   Leg Press 2-leg   TE   Leg Press 1-leg   TE      TE      TE   Step-ups - FWD   TA   Step-ups - LAT   TA   Step-ups - BWD   TA   Calf Raises   TA      TA      TA   Gait Progressed to 1 crutch in boot today           A:  Tolerated well. Discussed anatomy, physiology, body mechanics, principles of loading, and progressive loading/activity. Reviewed home exercise program extensively; written copy provided. P: Continue with rehab plan. Will bring ASO for inspection as well as shoe.    Ricardo Palencia, PT    Treatment Charges: Mins Units   Initial Evaluation 30 1   Re-Evaluation     Ther Exercise         TE 10 1   Manual Therapy     MT     Ther Activities        TA     Gait Training          GT     Neuro Re-education NR     Modalities     Non-Billable Service Time     Other     Total Time/Units 40 2

## 2022-04-08 ENCOUNTER — TREATMENT (OUTPATIENT)
Dept: PHYSICAL THERAPY | Age: 47
End: 2022-04-08
Payer: COMMERCIAL

## 2022-04-08 DIAGNOSIS — S82.841A BIMALLEOLAR ANKLE FRACTURE, RIGHT, CLOSED, INITIAL ENCOUNTER: Primary | ICD-10-CM

## 2022-04-08 PROCEDURE — 97110 THERAPEUTIC EXERCISES: CPT

## 2022-04-08 PROCEDURE — 97116 GAIT TRAINING THERAPY: CPT

## 2022-04-08 NOTE — PROGRESS NOTES
Physical Therapy Daily Treatment Note    Date: 22  Patient Name: Zachary Meraz  : 1975   MRN: [id number]  DOInjury: 2022   DOSx: --  Referring Provider: Sae Rubin, APRN - [facility] Diagnosis:      Diagnosis Orders   1. Bimalleolar ankle fracture, right, closed, initial encounter         Aravind is 7 weeks post injury. Treatment will be progressive weightbearing, stretching, strengthening. Access Code: [facility]  URL: [email or web address]  Date: 2022  Prepared by: Sybil Powell    Exercises  Ball Rolling - 2 x daily - 2 sets - 20 reps  Standing Ankle Plantarflexion Stretch - 2 x daily - 3 reps - 30 sec hold  Gastroc Stretch on Wall - 2 x daily - 3 reps - 10-30 sec hold      Precautions/Contraindications: Start 50% weight bearing in boot, advance to fwb in boot then dc boot and use aso    X = TO BE PERFORMED NEXT VISIT  > = PROGRESS TO THIS    S: Pt reports 2/10 pain. States has been ambulating short distances with one crutch. O:   Time 2546-6787     Visit 2/  1/wk Repeat outcome measure at mid point and end. Pain Pain 2/10     ROM Lacking 10 deg all dir     Modalities         MO   Manual            Stretch      Wall DF 3 x 30 sec  TE   Standing PF stretch with chair  3 x 30 sec  TE   Exercise      Nustep L5 x 10 min     Ball / can rolling X 40     BAPS NEXT  TE      TE   marching X 20  TE   Side kicks X 20  TE      TE   Leg Press 2-leg   TE   Leg Press 1-leg   TE      TE      TE   Step-ups - FWD NEXT  TA   Step-ups - LAT   TA   Step-ups - BWD   TA   Calf Raises   TA      TA      TA   Gait  1 crutch in ASO x 150ft  No crutch x 150ft           A:  Tolerated well. Progressed from 1 crutch ambulation to no crutches with ASO   P: Continue with rehab plan.   Nimral Davenport PTA    Treatment Charges: Mins Units   Initial Evaluation     Re-Evaluation     Ther Exercise         TE 30 2   Manual Therapy     MT     Ther Activities        TA     Gait Training          GT 10 1   Neuro Re-education NR     Modalities     Non-Billable Service Time     Other     Total Time/Units 40 3

## 2022-04-15 ENCOUNTER — TREATMENT (OUTPATIENT)
Dept: PHYSICAL THERAPY | Age: 47
End: 2022-04-15
Payer: COMMERCIAL

## 2022-04-15 DIAGNOSIS — S82.841A BIMALLEOLAR ANKLE FRACTURE, RIGHT, CLOSED, INITIAL ENCOUNTER: Primary | ICD-10-CM

## 2022-04-15 PROCEDURE — 97110 THERAPEUTIC EXERCISES: CPT

## 2022-04-15 NOTE — PROGRESS NOTES
Physical Therapy Daily Treatment Note    Date: 4/15/22  Patient Name: Dima Matthews  : 1975   MRN: [id number]  DOInjury: 2022   DOSx: --  Referring Provider: Junior Jeramy APRN - [facility]     Medical Diagnosis:      Diagnosis Orders   1. Bimalleolar ankle fracture, right, closed, initial encounter         Aravind is 7 weeks post injury. Treatment will be progressive weightbearing, stretching, strengthening. Access Code: [facility]  URL: [email or web address]  Date: 2022  Prepared by: Nico Vila    Exercises  Ball Rolling - 2 x daily - 2 sets - 20 reps  Standing Ankle Plantarflexion Stretch - 2 x daily - 3 reps - 30 sec hold  Gastroc Stretch on Wall - 2 x daily - 3 reps - 10-30 sec hold      Precautions/Contraindications: Start 50% weight bearing in boot, advance to fwb in boot then dc boot and use aso    X = TO BE PERFORMED NEXT VISIT  > = PROGRESS TO THIS    S: Pt reports 2/10 pain. States has been ambulating without crutch while at home  O:   Time 8921-2431     Visit 3/6  1/wk Repeat outcome measure at mid point and end. Pain Pain 2/10     ROM Lacking 10 deg all dir     Modalities         MO   Manual            Stretch      Wall DF 3 x 30 sec  TE   Standing PF stretch with chair  3 x 30 sec  TE   Exercise      Nustep      Bike  Seat #1 X 10 min     Ball / can rolling      BAPS L2 x 20 ea DIR  TE      TE   marching X 20  TE   Side kicks X 20  TE      TE   Leg Press 2-leg NEXT  TE   Leg Press 1-leg   TE      TE      TE   Step-ups - FWD 6\" x 20  TA   Step-ups - LAT 6\" x 20  TA   Step-ups - BWD   TA   Calf Raises   TA      TA   Marching in garcia NEXT  TA   Side stepping in garcia NEXT           Gait  1 crutch in ASO x 150ft  No crutch x 150ft           A:  Tolerated well. Has been ambulating household distances without crutch. Able to tolerate increase in activity.   Yumiko Mccabe PTA    Treatment Charges: Mins Units   Initial Evaluation     Re-Evaluation     Ther Exercise         TE 40 3   Manual Therapy     MT     Ther Activities        TA     Gait Training          GT     Neuro Re-education NR     Modalities     Non-Billable Service Time     Other     Total Time/Units 40 3

## 2022-04-22 ENCOUNTER — TREATMENT (OUTPATIENT)
Dept: PHYSICAL THERAPY | Age: 47
End: 2022-04-22
Payer: COMMERCIAL

## 2022-04-22 DIAGNOSIS — S82.841A BIMALLEOLAR ANKLE FRACTURE, RIGHT, CLOSED, INITIAL ENCOUNTER: Primary | ICD-10-CM

## 2022-04-22 PROCEDURE — 97110 THERAPEUTIC EXERCISES: CPT

## 2022-04-29 ENCOUNTER — TREATMENT (OUTPATIENT)
Dept: PHYSICAL THERAPY | Age: 47
End: 2022-04-29
Payer: COMMERCIAL

## 2022-04-29 DIAGNOSIS — S82.841A BIMALLEOLAR ANKLE FRACTURE, RIGHT, CLOSED, INITIAL ENCOUNTER: Primary | ICD-10-CM

## 2022-04-29 PROCEDURE — 97110 THERAPEUTIC EXERCISES: CPT

## 2022-04-29 NOTE — PROGRESS NOTES
Physical Therapy Daily Treatment Note    Date: 22  Patient Name: Jean Pierre Oliveira  : 1975   MRN: [id number]  DOInjury: 2022   DOSx: --  Referring Provider: LEVI Ríos [address] [place] [address] Diagnosis:      Diagnosis Orders   1. Bimalleolar ankle fracture, right, closed, initial encounter         Aravind is 7 weeks post injury. Treatment will be progressive weightbearing, stretching, strengthening. Access Code: [facility]  URL: [email or web address]  Date: 2022  Prepared by: Evgeny Doshi    Exercises  Ball Rolling - 2 x daily - 2 sets - 20 reps  Standing Ankle Plantarflexion Stretch - 2 x daily - 3 reps - 30 sec hold  Gastroc Stretch on Wall - 2 x daily - 3 reps - 10-30 sec hold      Precautions/Contraindications: Start 50% weight bearing in boot, advance to fwb in boot then dc boot and use aso    X = TO BE PERFORMED NEXT VISIT  > = PROGRESS TO THIS    S: Pt reports some shin pain last night but subsided today  O:   Time 3530-6910     Visit 5/6  1/wk Repeat outcome measure at mid point and end. Pain Pain 0/10     ROM PF 40*  DF 7*/12*     Modalities         MO   Manual            Stretch      On step - gastroc 4 x 30 sec TE   On step - sloeus 4 x 30 sec    Standing PF stretch with chair   TE   Exercise      Nustep      Bike  Seat #1 X 10 min     Ball / can rolling      BAPS   TE      TE   marching  TE   Side kicks  TE      TE   Leg Press 2-leg NEXT  TE   Leg Press 1-leg   TE   Total Gym 2-leg L17 3 x 25  TE      TE   Step-ups - FWD 7\" 2 x 30  TA   Step-ups - LAT 7\" 2 x 30  TA   Step-ups - BWD 4\" 2 x 30  TA   Calf Raises   TA      TA   Marching in garcia 2 x 45ft  TA   Side stepping in garcia 2 x 45ft           ambulation            A: Tolerated well. DF remains limited. Instructed and performed DF stretches on step to be done at home 3-4 times daily.   P: Continue POC    Jason Countess, PTA    Treatment Charges: Mins Units Initial Evaluation     Re-Evaluation     Ther Exercise         TE 40 3   Manual Therapy     MT     Ther Activities        TA     Gait Training          GT     Neuro Re-education NR     Modalities     Non-Billable Service Time     Other     Total Time/Units 40 3

## 2022-05-06 ENCOUNTER — TREATMENT (OUTPATIENT)
Dept: PHYSICAL THERAPY | Age: 47
End: 2022-05-06
Payer: COMMERCIAL

## 2022-05-06 DIAGNOSIS — S82.841A BIMALLEOLAR ANKLE FRACTURE, RIGHT, CLOSED, INITIAL ENCOUNTER: Primary | ICD-10-CM

## 2022-05-06 PROCEDURE — 97110 THERAPEUTIC EXERCISES: CPT

## 2022-05-06 NOTE — PROGRESS NOTES
Physical Therapy Daily Treatment Note    Date: 2022  Patient Name: Emelia Guan  : 1975   MRN: [id number]  DOInjury: 2022   DOSx: --  Referring Provider: Margo Casas, APRN - [facility]     Medical Diagnosis:      Diagnosis Orders   1. Bimalleolar ankle fracture, right, closed, initial encounter         Aravind is 7 weeks post injury. Treatment will be progressive weightbearing, stretching, strengthening. Access Code: [email or web address]  URL: [email or web address]  Date: 2022  Prepared by: Juan Carlos Mcgill    Exercises  Leg Swing Single Leg Balance - 1 x daily - 2-3 sets - 15-20 reps  Single Leg Balance Alphabet - 1 x daily - 2-3 sets  Mini Lunge - 3-4 x weekly - 2-3 sets - 15-20 reps  Forward Step Down - 3-4 x weekly - 2-3 sets - 15-20 reps    X = TO BE PERFORMED NEXT VISIT  > = PROGRESS TO THIS    S: Pt reports able to do more activities that she could do before injury. O:   Time 1661-2211     Visit -  1/wk Repeat outcome measure at mid point and end. Pain Pain 0/10     ROM PF 40*  DF 7*/12*     Modalities         MO   Manual            Stretch      On step - gastroc 4 x 30 sec TE   On step - sloeus 4 x 30 sec    Standing PF stretch with chair   TE   Exercise      Nustep      Bike  Seat #1 X 10 min     Ball / can rolling      BAPS   TE      TE   marching  TE   Side kicks  TE      TE   Leg Press 2-leg NEXT  TE   Leg Press 1-leg   TE   Total Gym 2-leg   TE      TE   Step-ups - FWD 7\" 3 x 30  TA   Step-ups - LAT 7\" 3 x 30  TA   Step-ups - BWD   TA   Calf Raises   TA   Lunges X 15 ea LE           SL kicks for balance X 20 L/R  X 20 F/B        TA   Marching in garcia  TA   Side stepping in garcia           ambulation            A: Tolerated well. DF remains limited.    P: Continue POC    Juan Carlos Mcgill, PTA    Treatment Charges: Mins Units   Initial Evaluation     Re-Evaluation     Ther Exercise         TE 40 3   Manual Therapy     MT     Ther Activities        TA     Gait Training          GT     Neuro Re-education NR     Modalities     Non-Billable Service Time     Other     Total Time/Units 40 3

## 2022-05-12 ENCOUNTER — HOSPITAL ENCOUNTER (OUTPATIENT)
Dept: MAMMOGRAPHY | Age: 47
Discharge: HOME | End: 2022-05-14
Payer: COMMERCIAL

## 2022-05-12 VITALS — WEIGHT: 165 LBS | BODY MASS INDEX: 29.23 KG/M2 | HEIGHT: 63 IN

## 2022-05-12 DIAGNOSIS — Z12.31 VISIT FOR SCREENING MAMMOGRAM: ICD-10-CM

## 2022-05-12 PROCEDURE — 77063 BREAST TOMOSYNTHESIS BI: CPT

## 2022-05-20 ENCOUNTER — TREATMENT (OUTPATIENT)
Dept: PHYSICAL THERAPY | Age: 47
End: 2022-05-20
Payer: COMMERCIAL

## 2022-05-20 DIAGNOSIS — S82.841A BIMALLEOLAR ANKLE FRACTURE, RIGHT, CLOSED, INITIAL ENCOUNTER: Primary | ICD-10-CM

## 2022-05-20 PROCEDURE — 97110 THERAPEUTIC EXERCISES: CPT

## 2022-05-20 PROCEDURE — 97530 THERAPEUTIC ACTIVITIES: CPT

## 2022-05-20 NOTE — PROGRESS NOTES
Physical Therapy Daily Treatment Note    Date: 2022  Patient Name: David Edwards  : 1975   MRN: [id number]  DOInjury: 2022   DOSx: --  Referring Provider: Kashif Amado APRN - [facility]     Medical Diagnosis:      Diagnosis Orders   1. Bimalleolar ankle fracture, right, closed, initial encounter         Aravind is 7 weeks post injury. Treatment will be progressive weightbearing, stretching, strengthening. Access Code: [email or web address]  URL: [email or web address]  Date: 2022  Prepared by: Philippe Hathaway    Exercises  Leg Swing Single Leg Balance - 1 x daily - 2-3 sets - 15-20 reps  Single Leg Balance Alphabet - 1 x daily - 2-3 sets  Mini Lunge - 3-4 x weekly - 2-3 sets - 15-20 reps  Forward Step Down - 3-4 x weekly - 2-3 sets - 15-20 reps    X = TO BE PERFORMED NEXT VISIT  > = PROGRESS TO THIS    S: Pt reports good/bad days. Doing more inside/outside house. Attending yoga  O:   Time 3959-2889     Visit 7/6-8  1/wk Repeat outcome measure at mid point and end. Pain Pain 0/10     ROM PF 40*  DF 7*/12*     Modalities         MO   Manual            Stretch      On step - gastroc 4 x 30 sec TE   On step - sloeus 4 x 30 sec    Standing PF stretch with chair   TE   Exercise      Nustep      Bike  Seat #1 X 10 min     Ball / can rolling      BAPS   TE      TE   marching  TE   Side kicks  TE      TE   Leg Press 2-leg NEXT  TE   Leg Press 1-leg   TE   Total Gym 2-leg   TE      TE   Step-ups - FWD 7\" x 30  TA   Step-ups - LAT 7\"  x 30  TA   Step-ups - BWD   TA   Heel touch lateral/front 4\" 2 x 15 ea     Calf Raises   TA   Lunges 2 X 15 ea LE     Squats 10# 14\" 2 x 15           SL kicks for balance X 20 L/R  X 20 F/B     Hurdles fwd/lateral 6 x 6 hurdles (high)  TA   Marching in garcia  TA   Side stepping in garcia           ambulation            A: Tolerated well. DF remains limited.    P: Continue POC    Philippe Hathaway, PTA    Treatment Charges: Mins Units   Initial Evaluation     Re-Evaluation     Ther Exercise         TE 25 2   Manual Therapy     MT     Ther Activities        TA 20 1   Gait Training          GT     Neuro Re-education NR     Modalities     Non-Billable Service Time     Other     Total Time/Units 45 3

## 2022-05-26 DIAGNOSIS — S82.841A BIMALLEOLAR ANKLE FRACTURE, RIGHT, CLOSED, INITIAL ENCOUNTER: Primary | ICD-10-CM

## 2022-05-27 ENCOUNTER — OFFICE VISIT (OUTPATIENT)
Dept: ORTHOPEDIC SURGERY | Age: 47
End: 2022-05-27
Payer: COMMERCIAL

## 2022-05-27 VITALS — WEIGHT: 165 LBS | TEMPERATURE: 98.5 F | BODY MASS INDEX: 29.23 KG/M2 | HEIGHT: 63 IN

## 2022-05-27 DIAGNOSIS — S82.841A BIMALLEOLAR ANKLE FRACTURE, RIGHT, CLOSED, INITIAL ENCOUNTER: Primary | ICD-10-CM

## 2022-05-27 PROCEDURE — 99213 OFFICE O/P EST LOW 20 MIN: CPT | Performed by: NURSE PRACTITIONER

## 2022-05-27 NOTE — PROGRESS NOTES
Chief Complaint   Patient presents with    Ankle Pain     right ankle bimalleolar fx follow up. DOI 2/12/22. Patient is slowly returning to normal activities. She does have pain with activity. Fernanda Pacheco returns today for follow-up of right ankle bimalleolar fracture that was treated conservatively . She reports that the pain is are improving. The pain is located lateral ankle. She has been going to physical therapy.     Past Medical History:   Diagnosis Date    COVID-19 02/2021    asymptomatic w pos test result    GERD (gastroesophageal reflux disease)      Past Surgical History:   Procedure Laterality Date    ENDOSCOPY, COLON, DIAGNOSTIC      TONSILLECTOMY         Current Outpatient Medications:     ibuprofen (ADVIL;MOTRIN) 800 MG tablet, Take 1 tablet by mouth every 8 hours as needed for Pain, Disp: 90 tablet, Rfl: 3    cyclobenzaprine (FLEXERIL) 5 MG tablet, TAKE 1 TABLET BY MOUTH EVERY DAY AS NEEDED, Disp: , Rfl:     hydrOXYzine (ATARAX) 25 MG tablet, TAKE 1 TABLET BY MOUTH EVERY 8 HOURS FOR 10 DAYS AS NEEDED, Disp: , Rfl:     omeprazole (PRILOSEC) 40 MG delayed release capsule, Take 40 mg by mouth daily AM, Disp: , Rfl:   No Known Allergies  Social History     Socioeconomic History    Marital status:      Spouse name: Not on file    Number of children: Not on file    Years of education: Not on file    Highest education level: Not on file   Occupational History    Not on file   Tobacco Use    Smoking status: Current Every Day Smoker     Packs/day: 0.50     Years: 27.00     Pack years: 13.50     Types: Cigarettes    Smokeless tobacco: Never Used   Vaping Use    Vaping Use: Never used   Substance and Sexual Activity    Alcohol use: Yes     Comment: rare    Drug use: No    Sexual activity: Not on file   Other Topics Concern    Not on file   Social History Narrative    Not on file     Social Determinants of Health     Financial Resource Strain:     Difficulty of Paying Living Expenses: Not on file   Food Insecurity:     Worried About Running Out of Food in the Last Year: Not on file    Ran Out of Food in the Last Year: Not on file   Transportation Needs:     Lack of Transportation (Medical): Not on file    Lack of Transportation (Non-Medical): Not on file   Physical Activity:     Days of Exercise per Week: Not on file    Minutes of Exercise per Session: Not on file   Stress:     Feeling of Stress : Not on file   Social Connections:     Frequency of Communication with Friends and Family: Not on file    Frequency of Social Gatherings with Friends and Family: Not on file    Attends Mosque Services: Not on file    Active Member of [address] [place] [address] [employer] or Organizations: Not on file    Attends Club or Organization Meetings: Not on file    Marital Status: Not on file   Intimate Partner Violence:     Fear of Current or Ex-Partner: Not on file    Emotionally Abused: Not on file    Physically Abused: Not on file    Sexually Abused: Not on file   Housing Stability:     Unable to Pay for Housing in the Last Year: Not on file    Number of [place] in the Last Year: Not on file    Unstable Housing in the Last Year: Not on file     History reviewed. No pertinent family history. Review of Systems:     Skin: (-) rash,(-) psoriasis,(-) eczema, (-)skin cancer. Musculoskeletal: (-) fractures,  (-) dislocations,(-) collagen vascular disease, (-) fibromyalgia, (-) multiple sclerosis, (-) muscular dystrophy, (-) RSD,(-) joint pain (-)swelling, (-) joint pain,swelling. Neurologic: (-) epilepsy, (-)seizures,(-) brain tumor,(-) TIA, (-)stroke, (-)headaches, (-)Parkinson disease,(-) memory loss, (-) LOC. Cardiovascular: (-) Chest pain, (-) swelling in legs/feet, (-) SOB, (-) cramping in legs/feet with walking. Constitutional:  The patient is alert and oriented x 3, appears to be stated age and in no distress.    Temp 98.5 °F (36.9 °C)   Ht 5' 3\" (1.6 m)   Wt 165 lb (74.8 kg) BMI 29.23 kg/m²     Skin:  Upon inspection: the skin appears warm, dry and intact. There is not a previous scar over the affected area. There is not any cellulitis, lymphedema or cutaneous lesions noted in the lower extremities. Upon palpation there is no induration noted. Neurologic:  Gait: normal;  Motor exam of the lower extremities show ; quadriceps, hamstrings, foot dorsi and plantar flexors intact R.  5/5 and L. 5/5. Deep tendon reflexes are 2/4 at the knees and 2/4 at the ankles with strong extensor hallicus longus motor strength bilaterally. Sensory to both feet is intact to all sensory roots. Cardiovascular: The vascular exam is normal and is well perfused to distal extremities. Distal pulses DP/PT: R. 2+; L. 2+. There is cap refill noted less than two seconds in all digits. There is not edema of the bilateral lower extremities. There is not varicosities noted in the distal extremities. Lymph:  Upon palpation,  there is no lymphadenopathy noted in bilateral lower extremities. Musculoskeletal:  Gait: normal; examination of the nails and digits reveal no cyanosis or clubbing. Knee exam:  Bilateral knee exam shows;  range of motion of R. Knee is 0 to 120, and L. Knee is 0 to 120. The patient does not have  pain on motion, effusion is none, there is not tenderness over the  medial, lateral, anterior region, there are not any masses, there is not ligamentous instability, there is not  deformity noted. Knee exam: neither positive for moderate crepitations, some mild tenderness laxity is not noted with  stress. Ankle exam:  Upon inspection and palpation of the Right ankle,  there is not deformity noted,  mild swelling, no ecchymosis, has pain on palpation of lateral ankle over distal fibula. ROM R/L : DF 15/15; PF 35/35;  INV 15/15, MARCOS 15/15. This exam was compared bilaterally.        Right Ankle:   (-) Anterior Drawer ,  (-) Posterior Drawer ,  (-) Squeeze test,  (-) External Rotation, (-) Eversion test , (-) Feldman Test     Left ankle:   (-) Anterior Drawer ,(-)  Posterior Drawer ,(-) Squeeze test,(-) External Rotation (-) Eversion test, (-) Feldman Test.    Foot exam:  visual inspection reveals warm, good capillary refill, there is not pain to palpation over the foot. ROM inversion/eversion full range of motion, abduction/adduction full range of motion, ROM in MTP/PIP/DIP full range of motion. X-Ray:  Ongoing, healing fractures of the medial and lateral malleolus.  No change in   alignment. Radiographic findings reviewed with patient    Impression:   Encounter Diagnosis   Name Primary?  Bimalleolar ankle fracture, right, closed, initial encounter Yes       Plan:  Natural history and expected course discussed. Questions answered. Rest, ice, compression, elevation (RICE) therapy.    Continue home exercise program  Ok to stop PT  otc analgesics prn  Calcium and vitamin d to help with healing  Fu in 2 months with xr

## 2022-07-28 DIAGNOSIS — S82.841A BIMALLEOLAR ANKLE FRACTURE, RIGHT, CLOSED, INITIAL ENCOUNTER: Primary | ICD-10-CM

## 2022-07-29 ENCOUNTER — OFFICE VISIT (OUTPATIENT)
Dept: ORTHOPEDIC SURGERY | Age: 47
End: 2022-07-29
Payer: COMMERCIAL

## 2022-07-29 VITALS — WEIGHT: 165 LBS | BODY MASS INDEX: 29.23 KG/M2 | HEIGHT: 63 IN

## 2022-07-29 DIAGNOSIS — S82.841A BIMALLEOLAR ANKLE FRACTURE, RIGHT, CLOSED, INITIAL ENCOUNTER: Primary | ICD-10-CM

## 2022-07-29 PROCEDURE — 99212 OFFICE O/P EST SF 10 MIN: CPT | Performed by: NURSE PRACTITIONER

## 2022-07-29 NOTE — PROGRESS NOTES
Chief Complaint   Patient presents with    Ankle Pain     Right ankle follow up. DOI 2/12/2022. Has more good days than bad. Able to do yoga and spinning class with only mild aching after       Yoseph Alden returns today for follow-up of right ankle bimalleolar fracture that was treated conservatively. She reports that the pain is improving. She continues to do HEP.     Past Medical History:   Diagnosis Date    COVID-19 02/2021    asymptomatic w pos test result    GERD (gastroesophageal reflux disease)      Past Surgical History:   Procedure Laterality Date    ENDOSCOPY, COLON, DIAGNOSTIC      TONSILLECTOMY         Current Outpatient Medications:     cyclobenzaprine (FLEXERIL) 5 MG tablet, TAKE 1 TABLET BY MOUTH EVERY DAY AS NEEDED, Disp: , Rfl:     hydrOXYzine (ATARAX) 25 MG tablet, TAKE 1 TABLET BY MOUTH EVERY 8 HOURS FOR 10 DAYS AS NEEDED, Disp: , Rfl:     omeprazole (PRILOSEC) 40 MG delayed release capsule, Take 40 mg by mouth daily AM, Disp: , Rfl:   No Known Allergies  Social History     Socioeconomic History    Marital status:      Spouse name: Not on file    Number of children: Not on file    Years of education: Not on file    Highest education level: Not on file   Occupational History    Not on file   Tobacco Use    Smoking status: Every Day     Packs/day: 0.50     Years: 27.00     Pack years: 13.50     Types: Cigarettes    Smokeless tobacco: Never   Vaping Use    Vaping Use: Never used   Substance and Sexual Activity    Alcohol use: Yes     Comment: rare    Drug use: No    Sexual activity: Not on file   Other Topics Concern    Not on file   Social History Narrative    Not on file     Social Determinants of Health     Financial Resource Strain: Not on file   Food Insecurity: Not on file   Transportation Needs: Not on file   Physical Activity: Not on file   Stress: Not on file   Social Connections: Not on file   Intimate Partner Violence: Not on file   Housing Stability: Not on file     No family history on file. Review of Systems:     Skin: (-) rash,(-) psoriasis,(-) eczema, (-)skin cancer. Musculoskeletal: (-) fractures,  (-) dislocations,(-) collagen vascular disease, (-) fibromyalgia, (-) multiple sclerosis, (-) muscular dystrophy, (-) RSD,(-) joint pain (-)swelling, (-) joint pain,swelling. Neurologic: (-) epilepsy, (-)seizures,(-) brain tumor,(-) TIA, (-)stroke, (-)headaches, (-)Parkinson disease,(-) memory loss, (-) LOC. Cardiovascular: (-) Chest pain, (-) swelling in legs/feet, (-) SOB, (-) cramping in legs/feet with walking. Constitutional:  The patient is alert and oriented x 3, appears to be stated age and in no distress. Ht 5' 3\" (1.6 m)   Wt 165 lb (74.8 kg)   BMI 29.23 kg/m²     Skin:  Upon inspection: the skin appears warm, dry and intact. There is not a previous scar over the affected area. There is not any cellulitis, lymphedema or cutaneous lesions noted in the lower extremities. Upon palpation there is no induration noted. Neurologic:  Gait: normal;  Motor exam of the lower extremities show ; quadriceps, hamstrings, foot dorsi and plantar flexors intact R.  5/5 and L. 5/5. Deep tendon reflexes are 2/4 at the knees and 2/4 at the ankles with strong extensor hallicus longus motor strength bilaterally. Sensory to both feet is intact to all sensory roots. Cardiovascular: The vascular exam is normal and is well perfused to distal extremities. Distal pulses DP/PT: R. 2+; L. 2+. There is cap refill noted less than two seconds in all digits. There is not edema of the bilateral lower extremities. There is not varicosities noted in the distal extremities. Lymph:  Upon palpation,  there is no lymphadenopathy noted in bilateral lower extremities. Musculoskeletal:  Gait: normal; examination of the nails and digits reveal no cyanosis or clubbing. Knee exam:  Bilateral knee exam shows;  range of motion of R. Knee is 0 to 120, and L. Knee is 0 to 120.  The patient does not have  pain on motion, effusion is none, there is not tenderness over the  medial, lateral, anterior region, there are not any masses, there is not ligamentous instability, there is not  deformity noted. Knee exam: neither positive for moderate crepitations, some mild tenderness laxity is not noted with  stress. Ankle exam:  Upon inspection and palpation of the Right ankle,  there is not deformity noted,  no swelling, no ecchymosis, has no pain on palpation of lateral ankle over distal fibula. ROM R/L : DF 15/15; PF 35/35;  INV 15/15, MARCOS 15/15. This exam was compared bilaterally. Right Ankle:   (-) Anterior Drawer ,  (-) Posterior Drawer ,  (-) Squeeze test,  (-) External Rotation, (-) Eversion test , (-) Feldman Test     Left ankle:   (-) Anterior Drawer ,(-)  Posterior Drawer ,(-) Squeeze test,(-) External Rotation (-) Eversion test, (-) Feldman Test.    Foot exam:  visual inspection reveals warm, good capillary refill, there is not pain to palpation over the foot. ROM inversion/eversion full range of motion, abduction/adduction full range of motion, ROM in MTP/PIP/DIP full range of motion. X-Ray:  Healed fractures of the medial and lateral malleolus. No change in   alignment. Radiographic findings reviewed with patient    Impression:   Encounter Diagnosis   Name Primary? Bimalleolar ankle fracture, right, closed, initial encounter Yes         Plan:  Natural history and expected course discussed. Questions answered. Rest, ice, compression, elevation (RICE) therapy.    Continue home exercise program  otc analgesics prn  Fu prn

## 2022-11-14 ENCOUNTER — OFFICE VISIT (OUTPATIENT)
Dept: ENT CLINIC | Age: 47
End: 2022-11-14
Payer: COMMERCIAL

## 2022-11-14 ENCOUNTER — PROCEDURE VISIT (OUTPATIENT)
Dept: AUDIOLOGY | Age: 47
End: 2022-11-14
Payer: COMMERCIAL

## 2022-11-14 VITALS
BODY MASS INDEX: 31.89 KG/M2 | DIASTOLIC BLOOD PRESSURE: 86 MMHG | HEART RATE: 91 BPM | WEIGHT: 180 LBS | SYSTOLIC BLOOD PRESSURE: 149 MMHG | HEIGHT: 63 IN

## 2022-11-14 DIAGNOSIS — M26.621 TENDERNESS OF RIGHT TEMPOROMANDIBULAR JOINT: ICD-10-CM

## 2022-11-14 DIAGNOSIS — H92.01 OTALGIA, RIGHT EAR: Primary | ICD-10-CM

## 2022-11-14 DIAGNOSIS — H93.8X1 SENSATION OF FULLNESS IN RIGHT EAR: Primary | ICD-10-CM

## 2022-11-14 DIAGNOSIS — H93.8X3 EAR FULLNESS, BILATERAL: ICD-10-CM

## 2022-11-14 DIAGNOSIS — H93.8X1 EAR PRESSURE, RIGHT: ICD-10-CM

## 2022-11-14 DIAGNOSIS — R09.81 NASAL CONGESTION: ICD-10-CM

## 2022-11-14 DIAGNOSIS — J30.9 ALLERGIC RHINITIS, UNSPECIFIED SEASONALITY, UNSPECIFIED TRIGGER: ICD-10-CM

## 2022-11-14 DIAGNOSIS — H93.8X1 PRESSURE SENSATION IN RIGHT EAR: ICD-10-CM

## 2022-11-14 PROCEDURE — 92504 EAR MICROSCOPY EXAMINATION: CPT | Performed by: NURSE PRACTITIONER

## 2022-11-14 PROCEDURE — 92567 TYMPANOMETRY: CPT | Performed by: AUDIOLOGIST

## 2022-11-14 PROCEDURE — 99203 OFFICE O/P NEW LOW 30 MIN: CPT | Performed by: NURSE PRACTITIONER

## 2022-11-14 PROCEDURE — 92557 COMPREHENSIVE HEARING TEST: CPT | Performed by: AUDIOLOGIST

## 2022-11-14 RX ORDER — AZELASTINE 1 MG/ML
1-2 SPRAY, METERED NASAL 2 TIMES DAILY PRN
Qty: 30 ML | Refills: 1 | Status: SHIPPED | OUTPATIENT
Start: 2022-11-14

## 2022-11-14 ASSESSMENT — ENCOUNTER SYMPTOMS
FACIAL SWELLING: 1
RESPIRATORY NEGATIVE: 1
RHINORRHEA: 0
SHORTNESS OF BREATH: 0
SINUS PRESSURE: 0
STRIDOR: 0
SINUS PAIN: 0
EYES NEGATIVE: 1

## 2022-11-14 NOTE — PROGRESS NOTES
[id number] [facility] Otolaryngology  Dr. Hamida Bustos. MASSIMO De La Vega Ms.Ed. New Consult       Patient Name:  Shelia Richmond  :  1975     CHIEF C/O:    Chief Complaint   Patient presents with    Ear Problem     Patient states that she has had ear pressure and fluid in her ears, she states that she has had a lot of sinus pressure        HISTORY OBTAINED FROM:  patient    HISTORY OF PRESENT ILLNESS:       Bon Rice is a 49y.o. year old female, here today for ear pressure, facial numbness in right side of face. Symptoms for 2-3 months  Right ear fullness, feels fluid in right ear  Seen by urgent care and PCP, told fluid and possible sinus infection  Hx of sinus infections but none for 10 years  No hx of recurrent ear infections or previous ear surgeries  No previous dx of hearing loss  No family hx of hearing loss  Does have itching and crawling sensation in right ear  Right facial numbness into cheek, has been intermittent since having dental work 1 year ago  Does admit to teeth clenching and grinding  Had steroids and antibiotics in September, mild improvement  Does have persistent sensation of congestion without considerable rhinorrhea or postnasal drainage  [facility] with [facility] improvement of her congestion symptoms. Past Medical History:   Diagnosis Date    COVID-19 2021    asymptomatic w pos test result    GERD (gastroesophageal reflux disease)      Past Surgical History:   Procedure Laterality Date    ENDOSCOPY, COLON, DIAGNOSTIC      TONSILLECTOMY         Current Outpatient Medications:     cyclobenzaprine (FLEXERIL) 5 MG tablet, TAKE 1 TABLET BY MOUTH EVERY DAY AS NEEDED, Disp: , Rfl:     hydrOXYzine (ATARAX) 25 MG tablet, TAKE 1 TABLET BY MOUTH EVERY 8 HOURS FOR 10 DAYS AS NEEDED, Disp: , Rfl:     omeprazole (PRILOSEC) 40 MG delayed release capsule, Take 40 mg by mouth daily AM, Disp: , Rfl:   Patient has no known allergies.   Social History     Tobacco Use    Smoking status: Former     Packs/day: 0.50     Years: 27.00     Pack years: 13.50     Types: Cigarettes    Smokeless tobacco: Never   Vaping Use    Vaping Use: Never used   Substance Use Topics    Alcohol use: Yes     Comment: rare    Drug use: No     History reviewed. No pertinent family history. Review of Systems   Constitutional: Negative. Negative for activity change and appetite change. HENT:  Positive for congestion, facial swelling (Right facial numbness) and hearing loss (Muffled right ear). Negative for postnasal drip, rhinorrhea, sinus pressure and sinus pain. Eyes: Negative. Respiratory: Negative. Negative for shortness of breath and stridor. Cardiovascular: Negative. Negative for chest pain and palpitations. Endocrine: Negative. Musculoskeletal: Negative. Skin: Negative. Neurological: Negative. Negative for dizziness. Hematological: Negative. Psychiatric/Behavioral: Negative. BP (!) 149/86   Pulse 91   Ht 5' 3\" (1.6 m)   Wt 180 lb (81.6 kg)   BMI 31.89 kg/m²   Physical Exam  Constitutional:       Appearance: Normal appearance. HENT:      Head: Normocephalic. Jaw: Tenderness present. Right Ear: Tympanic membrane and external ear normal.      Left Ear: Tympanic membrane, ear canal and external ear normal.      Ears:        Nose: Nose normal.      Right Turbinates: Pale. Left Turbinates: Pale. Mouth/Throat:      Lips: Pink. Mouth: Mucous membranes are moist.      Pharynx: Oropharynx is clear. Eyes:      Conjunctiva/sclera: Conjunctivae normal.      Pupils: Pupils are equal, round, and reactive to light. Cardiovascular:      Rate and Rhythm: Normal rate and regular rhythm. Pulses: Normal pulses. Pulmonary:      Effort: Pulmonary effort is normal. No respiratory distress. Breath sounds: No stridor. Musculoskeletal:         General: Normal range of motion. Cervical back: Normal range of motion. No rigidity. No muscular tenderness. Skin:     General: Skin is warm and dry. Neurological:      General: No focal deficit present. Mental Status: She is alert and oriented to person, place, and time. Psychiatric:         Mood and Affect: Mood normal.         Behavior: Behavior normal.         Thought Content: Thought content normal.         Judgment: Judgment normal.               Audiogram and tympanogram reviewed with patient. Audiogram reveals 5 dB hearing loss in the right ear with 100% discrimination at 45 dB, 10 dB of hearing loss in the left ear with 100% discrimination at 50 dB. Audiogram is symmetrical. Tympanogram reveals type A curve in the right ear, with type A curve in the left ear. IMPRESSION/PLAN:  Ear Microscopy:  Right ear was examined under microscope for otalgia and obstruction of the ear canal due to dry skin. This was removed with alligator forceps to reveal a normal-appearing tympanic membrane. Patient states no change in her hearing following the removal.  Patient tolerated well. Aravind was seen today for ear problem. Diagnoses and all orders for this visit:    Otalgia, right ear  -     OK EAR MICROSCOPY EXAMINATION    Tenderness of right temporomandibular joint    Allergic rhinitis, unspecified seasonality, unspecified trigger    Nasal congestion    Other orders  -     azelastine (ASTELIN) 0.1 % nasal spray; 1-2 sprays by Nasal route 2 times daily as needed for Rhinitis Use in each nostril as directed      At this time patient will continue with her Flonase, 2 sprays each nostril once daily and be given Astelin nasal spray, 1 to 2 sprays each nostril twice daily as needed for her congestion. She is also encouraged to begin using warm compresses and NSAID medication for right-sided TMJ tenderness. She will consult her dentist for possible bite guard due to TMJ and teeth grinding. She will follow-up in 6 weeks for reevaluation.   She is instructed to call with any new or worsening symptoms prior to her next appointment.       Victory Nyhan, MSN, FNP-C  [facility], Nose and Throat    The information contained in this note has been dictated using drug and medical speech recognition software and may contain errors

## 2022-11-14 NOTE — PROGRESS NOTES
This patient was referred for audiometric and tympanometric testing by ROBERT Bell due to ear fullness/pressurem, that is worse in the right ear. Audiometry using pure tone air and bone conduction testing revealed normal hearing sensitivity, through the frequency range, bilaterally. Reliability was good. Speech reception thresholds were in good agreement with the pure tone averages, bilaterally. Speech discrimination scores were 100%, bilaterally at 45-50dBHL. Tympanometry revealed normal middle ear peak pressure and compliance, bilaterally. The results were reviewed with the patient. Recommendations for follow up will be made pending physician consult.     Dorian Chambers CCC/LUCITA  Audiologist  [id number]  NPI#:  [id number]      Electronically signed by Tito Costa on 11/14/2022 at 10:38 AM

## 2022-12-29 ENCOUNTER — OFFICE VISIT (OUTPATIENT)
Dept: ENT CLINIC | Age: 47
End: 2022-12-29
Payer: COMMERCIAL

## 2022-12-29 VITALS — HEIGHT: 63 IN | WEIGHT: 180 LBS | BODY MASS INDEX: 31.89 KG/M2

## 2022-12-29 DIAGNOSIS — M26.621 TENDERNESS OF RIGHT TEMPOROMANDIBULAR JOINT: ICD-10-CM

## 2022-12-29 DIAGNOSIS — J34.89 SINUS PRESSURE: ICD-10-CM

## 2022-12-29 DIAGNOSIS — H93.8X3 EAR FULLNESS, BILATERAL: ICD-10-CM

## 2022-12-29 DIAGNOSIS — R09.81 NASAL CONGESTION: ICD-10-CM

## 2022-12-29 DIAGNOSIS — J34.2 DNS (DEVIATED NASAL SEPTUM): Primary | ICD-10-CM

## 2022-12-29 PROCEDURE — 99214 OFFICE O/P EST MOD 30 MIN: CPT | Performed by: NURSE PRACTITIONER

## 2022-12-29 ASSESSMENT — ENCOUNTER SYMPTOMS
EYES NEGATIVE: 1
FACIAL SWELLING: 1
RESPIRATORY NEGATIVE: 1
STRIDOR: 0
SINUS PRESSURE: 1
SHORTNESS OF BREATH: 0
RHINORRHEA: 0
SINUS PAIN: 0

## 2022-12-29 NOTE — PROGRESS NOTES
[facility] Otolaryngology  Dr. Shikha Aranda. Giuliana Patel Ms.Ed        Patient Name:  Srikanth Berger  :  1975     CHIEF C/O:    Chief Complaint   Patient presents with    Follow-up     Patient states minor improvement in ear discomfort since last visit, states that she is currently having sinus pressure with out drainage. States that she used nasal sprays but feels as if she is having some anxiety issues when using the meds. HISTORY OBTAINED FROM:  patient    HISTORY OF PRESENT ILLNESS:       Dianne Joseph is a 49y.o. year old female, here today for follow up of bilateral ear pain. Last seen 6 weeks ago  Using flonase daily with astelin intermittently  Warm compresses and NSAIDS for TMJ, some relief, trying to use mouth guard  Continues to have sinus congestion and pressure with medications  No recent fevers or antibiotics  Denies persistent PND or rhinorrhea  No previous sinus imaging or surgery  Looking to get dental implant and told she would need a \"sinus lift\" for the procedure  No current muffled hearing or ringing in ears  No dizziness. Past Medical History:   Diagnosis Date    COVID-19 2021    asymptomatic w pos test result    GERD (gastroesophageal reflux disease)      Past Surgical History:   Procedure Laterality Date    ENDOSCOPY, COLON, DIAGNOSTIC      TONSILLECTOMY         Current Outpatient Medications:     azelastine (ASTELIN) 0.1 % nasal spray, 1-2 sprays by Nasal route 2 times daily as needed for Rhinitis Use in each nostril as directed, Disp: 30 mL, Rfl: 1    cyclobenzaprine (FLEXERIL) 5 MG tablet, TAKE 1 TABLET BY MOUTH EVERY DAY AS NEEDED, Disp: , Rfl:     hydrOXYzine (ATARAX) 25 MG tablet, TAKE 1 TABLET BY MOUTH EVERY 8 HOURS FOR 10 DAYS AS NEEDED, Disp: , Rfl:     omeprazole (PRILOSEC) 40 MG delayed release capsule, Take 40 mg by mouth daily AM, Disp: , Rfl:   Patient has no known allergies.   Social History     Tobacco Use    Smoking status: Former     Packs/day: 0.50 Years: 27.00     Pack years: 13.50     Types: Cigarettes    Smokeless tobacco: Never   Vaping Use    Vaping Use: Never used   Substance Use Topics    Alcohol use: Yes     Comment: rare    Drug use: No     History reviewed. No pertinent family history. Review of Systems   Constitutional: Negative. Negative for activity change and appetite change. HENT:  Positive for congestion, facial swelling (Right facial numbness), hearing loss (Muffled right ear) and sinus pressure. Negative for postnasal drip, rhinorrhea and sinus pain. Eyes: Negative. Respiratory: Negative. Negative for shortness of breath and stridor. Cardiovascular: Negative. Negative for chest pain and palpitations. Endocrine: Negative. Musculoskeletal: Negative. Skin: Negative. Neurological: Negative. Negative for dizziness. Hematological: Negative. Psychiatric/Behavioral: Negative. Ht 5' 3\" (1.6 m)   Wt 180 lb (81.6 kg)   BMI 31.89 kg/m²   Physical Exam  Constitutional:       Appearance: Normal appearance. HENT:      Head: Normocephalic. Jaw: Tenderness present. Right Ear: Tympanic membrane, ear canal and external ear normal.      Left Ear: Tympanic membrane, ear canal and external ear normal.      Nose: Septal deviation present. Right Turbinates: Swollen and pale. Left Turbinates: Swollen and pale. Mouth/Throat:      Lips: Pink. Mouth: Mucous membranes are moist.      Pharynx: Oropharynx is clear. Eyes:      Conjunctiva/sclera: Conjunctivae normal.      Pupils: Pupils are equal, round, and reactive to light. Cardiovascular:      Rate and Rhythm: Normal rate and regular rhythm. Pulses: Normal pulses. Pulmonary:      Effort: Pulmonary effort is normal. No respiratory distress. Breath sounds: No stridor. Musculoskeletal:         General: Normal range of motion. Cervical back: Normal range of motion. No rigidity. No muscular tenderness.    Skin:     General: Skin is warm and dry. Neurological:      General: No focal deficit present. Mental Status: She is alert and oriented to person, place, and time. Psychiatric:         Mood and Affect: Mood normal.         Behavior: Behavior normal.         Thought Content: Thought content normal.         Judgment: Judgment normal.       IMPRESSION/PLAN:    Aravind was seen today for follow-up. Diagnoses and all orders for this visit:    DNS (deviated nasal septum)  -     CT SINUS WO CONTRAST; Future    Nasal congestion  -     CT SINUS WO CONTRAST; Future    Sinus pressure  -     CT SINUS WO CONTRAST; Future    Tenderness of right temporomandibular joint    Ear fullness, bilateral    This patient will benefit from a CT of the sinuses without contrast due to chronic sinus congestion, chronic sinus pressure, and failing medical therapy for further evaluation of their condition. Patient has completed 6 weeks of Flonase and Astelin without significant improvement of their symptoms. At this time patient will continue with her Flonase, 2 sprays each nostril once daily with Astelin spray, 1 to 2 sprays each nostril twice daily as needed begin using nasal saline spray, 2 sprays each nostril 3-4 times daily. CT of the sinuses will be completed for further evaluation of her symptoms. She will continue with warm compresses and NSAID medications for bilateral TMJ tenderness and will follow-up with her dentist to have a custom mouthguard made. She will follow-up in 1 month for reevaluation and results. She is instructed to call with any new or worsening symptoms prior to her next appointment.         Marinane Chery, MSN, FNP-C  8 [facility], Nose and Throat    The information contained in this note has been dictated using drug and medical speech recognition software and may contain errors

## 2023-01-30 ENCOUNTER — TELEPHONE (OUTPATIENT)
Dept: ENT CLINIC | Age: 48
End: 2023-01-30

## 2023-01-30 NOTE — TELEPHONE ENCOUNTER
Called patient and left a message that we need more information where was the scan done and we can look into that scan to see is it is adequate for what we need

## 2023-01-30 NOTE — TELEPHONE ENCOUNTER
Patient called to ask if a scan of her sinus' ordered by dentist would be ok in place of her CT sinus ordered by LIGIA Garcia. States she has a disk available. Please advise.

## 2023-02-01 NOTE — TELEPHONE ENCOUNTER
LMOVM to inform patient that she may drop off disc from dentist and Thony Calvillo will review and inform patient after review if she needs a CT Sinus done

## 2023-02-03 ENCOUNTER — HOSPITAL ENCOUNTER (OUTPATIENT)
Dept: CT IMAGING | Age: 48
Discharge: HOME | End: 2023-02-03
Payer: COMMERCIAL

## 2023-02-03 DIAGNOSIS — R09.81 NASAL CONGESTION: ICD-10-CM

## 2023-02-03 DIAGNOSIS — J34.2 DNS (DEVIATED NASAL SEPTUM): ICD-10-CM

## 2023-02-03 DIAGNOSIS — J34.89 SINUS PRESSURE: ICD-10-CM

## 2023-02-03 PROCEDURE — 70486 CT MAXILLOFACIAL W/O DYE: CPT

## 2023-02-13 ENCOUNTER — OFFICE VISIT (OUTPATIENT)
Dept: ENT CLINIC | Age: 48
End: 2023-02-13
Payer: COMMERCIAL

## 2023-02-13 VITALS
DIASTOLIC BLOOD PRESSURE: 86 MMHG | WEIGHT: 181.7 LBS | SYSTOLIC BLOOD PRESSURE: 125 MMHG | BODY MASS INDEX: 32.2 KG/M2 | HEART RATE: 90 BPM | HEIGHT: 63 IN

## 2023-02-13 DIAGNOSIS — J34.89 NASAL SEPTAL SPUR: ICD-10-CM

## 2023-02-13 DIAGNOSIS — J30.9 ALLERGIC RHINITIS, UNSPECIFIED SEASONALITY, UNSPECIFIED TRIGGER: ICD-10-CM

## 2023-02-13 DIAGNOSIS — M26.621 TENDERNESS OF RIGHT TEMPOROMANDIBULAR JOINT: ICD-10-CM

## 2023-02-13 DIAGNOSIS — R09.81 NASAL CONGESTION: ICD-10-CM

## 2023-02-13 DIAGNOSIS — J34.2 DNS (DEVIATED NASAL SEPTUM): Primary | ICD-10-CM

## 2023-02-13 PROCEDURE — 99213 OFFICE O/P EST LOW 20 MIN: CPT | Performed by: NURSE PRACTITIONER

## 2023-02-13 RX ORDER — AZELASTINE 1 MG/ML
1-2 SPRAY, METERED NASAL 2 TIMES DAILY PRN
Qty: 90 ML | Refills: 1 | Status: SHIPPED | OUTPATIENT
Start: 2023-02-13

## 2023-02-13 RX ORDER — FLUTICASONE PROPIONATE 50 MCG
2 SPRAY, SUSPENSION (ML) NASAL DAILY
Qty: 48 G | Refills: 1 | Status: SHIPPED | OUTPATIENT
Start: 2023-02-13

## 2023-02-13 ASSESSMENT — ENCOUNTER SYMPTOMS
EYES NEGATIVE: 1
SINUS PRESSURE: 0
RHINORRHEA: 0
FACIAL SWELLING: 1
SINUS PAIN: 0
RESPIRATORY NEGATIVE: 1
STRIDOR: 0
SHORTNESS OF BREATH: 0

## 2023-02-13 NOTE — PROGRESS NOTES
[id number] [facility] Otolaryngology  Dr. Silvestre Monroe. Marissa Sandoval. Ms.Ed        Patient Name:  Randi Ayala  :  1975     CHIEF C/O:    Chief Complaint   Patient presents with    Follow-up     1 month follow up CT results        HISTORY OBTAINED FROM:  patient    HISTORY OF PRESENT ILLNESS:       Favio Hernandez is a 49y.o. year old female, here today for follow up of nasal congestion and CT results. Patient was last seen 1 month ago and continues using Flonase and Astelin nasal sprays with moderate relief of her nasal congestion symptoms. She did undergo a CT of the sinuses showing leftward nasal septal spur with hypertrophy of the inferior turbinates. She states that with the current combination of Flonase and Astelin her symptoms have decreased with minimal congestion, rhinorrhea, and postnasal drainage. She states that she has not interested in surgery at this time as she has received moderate relief with the current medications. She denies any current sinus pain or pressure. She denies any recent fevers or recent antibiotics. She did begin using a bite guard for her TMJ which is helping her ear and jaw pain symptoms. She has no new complaints at this time.       Past Medical History:   Diagnosis Date    COVID-19 2021    asymptomatic w pos test result    GERD (gastroesophageal reflux disease)      Past Surgical History:   Procedure Laterality Date    ENDOSCOPY, COLON, DIAGNOSTIC      TONSILLECTOMY         Current Outpatient Medications:     azelastine (ASTELIN) 0.1 % nasal spray, 1-2 sprays by Nasal route 2 times daily as needed for Rhinitis Use in each nostril as directed, Disp: 30 mL, Rfl: 1    cyclobenzaprine (FLEXERIL) 5 MG tablet, TAKE 1 TABLET BY MOUTH EVERY DAY AS NEEDED, Disp: , Rfl:     hydrOXYzine (ATARAX) 25 MG tablet, TAKE 1 TABLET BY MOUTH EVERY 8 HOURS FOR 10 DAYS AS NEEDED, Disp: , Rfl:     omeprazole (PRILOSEC) 40 MG delayed release capsule, Take 40 mg by mouth daily AM, Disp: , Rfl:   Patient has no known allergies. Social History     Tobacco Use    Smoking status: Former    Smokeless tobacco: Never   Vaping Use    Vaping Use: Never used   Substance Use Topics    Alcohol use: Yes     Comment: rare    Drug use: No     History reviewed. No pertinent family history. Review of Systems   Constitutional: Negative. Negative for activity change and appetite change. HENT:  Positive for congestion and facial swelling (Right facial numbness). Negative for hearing loss, postnasal drip, rhinorrhea, sinus pressure and sinus pain. Eyes: Negative. Respiratory: Negative. Negative for shortness of breath and stridor. Cardiovascular: Negative. Negative for chest pain and palpitations. Endocrine: Negative. Musculoskeletal: Negative. Skin: Negative. Neurological: Negative. Negative for dizziness. Hematological: Negative. Psychiatric/Behavioral: Negative. /86 (Site: Left Upper Arm, Position: Sitting, Cuff Size: Large Adult)   Pulse 90   Ht 5' 3\" (1.6 m)   Wt 181 lb 11.2 oz (82.4 kg)   LMP 02/07/2023   BMI 32.19 kg/m²   Physical Exam  Constitutional:       Appearance: Normal appearance. HENT:      Head: Normocephalic. Jaw: Tenderness present. Right Ear: Tympanic membrane, ear canal and external ear normal.      Left Ear: Tympanic membrane, ear canal and external ear normal.      Nose: Septal deviation present. Right Turbinates: Swollen and pale. Left Turbinates: Swollen and pale. Mouth/Throat:      Lips: Pink. Mouth: Mucous membranes are moist.      Pharynx: Oropharynx is clear. Eyes:      Conjunctiva/sclera: Conjunctivae normal.      Pupils: Pupils are equal, round, and reactive to light. Cardiovascular:      Rate and Rhythm: Normal rate and regular rhythm. Pulses: Normal pulses. Pulmonary:      Effort: Pulmonary effort is normal. No respiratory distress. Breath sounds: No stridor.    Musculoskeletal:         General: Normal range of motion. Cervical back: Normal range of motion. No rigidity. No muscular tenderness. Skin:     General: Skin is warm and dry. Neurological:      General: No focal deficit present. Mental Status: She is alert and oriented to person, place, and time. Psychiatric:         Mood and Affect: Mood normal.         Behavior: Behavior normal.         Thought Content: Thought content normal.         Judgment: Judgment normal.       HISTORY:   ORDERING SYSTEM PROVIDED HISTORY: DNS (deviated nasal septum)   TECHNOLOGIST PROVIDED HISTORY:   Dr. Negar Ramirez   Reason for exam:->DNS, nasal congestion, sinus pressure, failing medical   therapy. FINDINGS:   SINUSES/MASTOIDS:  The maxillary, sphenoid, ethmoid and frontal sinuses are   clear. The bilateral ostiomeatal units are patent. Ethmoid roofs are   symmetric. The mastoid air cells are well aerated. Leftward deviation of   the nasal septum is noted measuring 2 mm. Leftward projecting osseous spur   is seen at the nasal septum which measures up to 5 mm in length. SOFT TISSUES:  Visualized soft tissues demonstrate no acute abnormality. The   visualized portion of the intracranial contents demonstrate no gross acute   abnormality. Impression   No evidence of sinusitis. Leftward deviation of the nasal septum measuring 2 mm in association with   leftward projecting nasal septal spur measuring up to 5 mm which partially   effaces the left nasal cavity. IMPRESSION/PLAN:    Debora Amaya was seen today for follow-up.     Diagnoses and all orders for this visit:    DNS (deviated nasal septum)    Nasal congestion    Nasal septal spur    Allergic rhinitis, unspecified seasonality, unspecified trigger    Tenderness of right temporomandibular joint    Other orders  -     azelastine (ASTELIN) 0.1 % nasal spray; 1-2 sprays by Nasal route 2 times daily as needed for Rhinitis Use in each nostril as directed  - fluticasone (FLONASE) 50 MCG/ACT nasal spray; 2 sprays by Each Nostril route daily      CT results are reviewed with the patient who is declining any surgical intervention at this time. Discussed with the patient that should her symptoms worsen that this would still be an option in the future. She will continue with her Flonase, 2 sprays each nostril once daily with nasal saline spray, 2 sprays each nostril 3-4 times daily and Astelin spray, 1 to 2 sprays each nostril twice daily as needed. She will continue with her warm compresses, NSAIDs, and bite guard for her TMJ symptoms. She will follow-up in 3 months for reevaluation. She is instructed to call with any new or worsening symptoms prior to her next appointment.         Veronica Patel, MSN, FNP-C  8 [facility], Nose and Throat    The information contained in this note has been dictated using drug and medical speech recognition software and may contain errors

## 2023-05-15 ENCOUNTER — OFFICE VISIT (OUTPATIENT)
Dept: ENT CLINIC | Age: 48
End: 2023-05-15
Payer: COMMERCIAL

## 2023-05-15 VITALS
BODY MASS INDEX: 32.21 KG/M2 | WEIGHT: 181.8 LBS | SYSTOLIC BLOOD PRESSURE: 117 MMHG | HEIGHT: 63 IN | HEART RATE: 89 BPM | DIASTOLIC BLOOD PRESSURE: 76 MMHG

## 2023-05-15 DIAGNOSIS — J34.89 NASAL SEPTAL SPUR: ICD-10-CM

## 2023-05-15 DIAGNOSIS — M26.621 TENDERNESS OF RIGHT TEMPOROMANDIBULAR JOINT: ICD-10-CM

## 2023-05-15 DIAGNOSIS — R09.81 NASAL CONGESTION: ICD-10-CM

## 2023-05-15 DIAGNOSIS — J30.9 ALLERGIC RHINITIS, UNSPECIFIED SEASONALITY, UNSPECIFIED TRIGGER: ICD-10-CM

## 2023-05-15 DIAGNOSIS — J34.2 DNS (DEVIATED NASAL SEPTUM): Primary | ICD-10-CM

## 2023-05-15 PROCEDURE — 99213 OFFICE O/P EST LOW 20 MIN: CPT | Performed by: NURSE PRACTITIONER

## 2023-05-15 ASSESSMENT — ENCOUNTER SYMPTOMS
RHINORRHEA: 0
SINUS PRESSURE: 0
FACIAL SWELLING: 0
STRIDOR: 0
SHORTNESS OF BREATH: 0
RESPIRATORY NEGATIVE: 1
EYES NEGATIVE: 1
SINUS PAIN: 0

## 2023-05-15 NOTE — PROGRESS NOTES
[facility]  Dr. Florin Youngblood. Chaparro Landry D.O. Ms.Ed        Patient Name:  Rocío Room  :  1975     CHIEF C/O:    Chief Complaint   Patient presents with    Follow-up     3 month follow up deviated septum patient has pressure right side of face patient states that she has appt tomorrow with dentist for tooth        HISTORY OBTAINED FROM:  patient    HISTORY OF PRESENT ILLNESS:       Cristiano Casanova is a 49y.o. year old female, here today for follow up of sinus congestion and right ear pain. Last seen 3 months ago  Currently using flonase and astelin for her sinuses  States symptoms are fairly well controlled at this time  Mild increase in congestion with seasonal changes  Intermittent PND and rhinorrhea  No sinus pain or pressure  Notes right ear pain for several days, does have TMJ and also has a tooth bothering her on the right lower jaw - seeing dentist tomorrow  Does have tenderness over Right tmj   No recent fevers or antibiotics        Past Medical History:   Diagnosis Date    COVID-19 2021    asymptomatic w pos test result    GERD (gastroesophageal reflux disease)      Past Surgical History:   Procedure Laterality Date    ENDOSCOPY, COLON, DIAGNOSTIC      TONSILLECTOMY         Current Outpatient Medications:     azelastine (ASTELIN) 0.1 % nasal spray, 1-2 sprays by Nasal route 2 times daily as needed for Rhinitis Use in each nostril as directed, Disp: 90 mL, Rfl: 1    fluticasone (FLONASE) 50 MCG/ACT nasal spray, 2 sprays by Each Nostril route daily, Disp: 48 g, Rfl: 1    cyclobenzaprine (FLEXERIL) 5 MG tablet, TAKE 1 TABLET BY MOUTH EVERY DAY AS NEEDED, Disp: , Rfl:     hydrOXYzine (ATARAX) 25 MG tablet, TAKE 1 TABLET BY MOUTH EVERY 8 HOURS FOR 10 DAYS AS NEEDED, Disp: , Rfl:     omeprazole (PRILOSEC) 40 MG delayed release capsule, Take 1 capsule by mouth daily AM, Disp: , Rfl:   Patient has no known allergies.   Social History     Tobacco Use    Smoking status: Former    Smokeless tobacco:

## 2023-05-29 NOTE — PROGRESS NOTES
Physical Therapy Daily Treatment Note    Date: 4/3/2019  Patient Name: Naomi Hilario  : 1975   MRN: [id number]  DOInjury: 2019, 2018  DOSx: --   Referring Provider: Mendy Luis DO  [address] [place] [address], [address] [place] [address]     Medical Diagnosis:      Diagnosis Orders   1. Neck pain     2. Myofascial pain     3. Cervical radiculopathy         S: Sore from work  O:  Time 7751-2119     Visit      Pain 2/10     ROM Dec ext, SB, ROT     Modalities      MH + ES PreMod L upper trap  Supine; large HP  X 15 min  MO   Manual         MT         Exercise   TE   Supine neck flexion   NR   Quadruped neck retraction   NR   Cervical lateral flexion isometrics  Reviewed   TE   Cervical extension isometrics Reviewed  TE   Cervical flexion isometrics Reviewed  TE   UBE  or   TE   Nustep     TE   ROWS: H  Green 3 x 12 Pull down next? TA   ROWS: M  Green 3 x 12  Seated row next? TA   ROWS: L Green 3 x 12  TA   Biceps  Next   TA   Triceps  Next   TA   Shrugs Next? TE   Shoulder Press Next   TA                     A:  Tolerated well. Large, functional, dynamic, global movements used to build strength, balance, endurance, and flexibility and to improve physical performance. P: Continue with rehab plan. Patient to perform cardio daily as tolerated. She can work out in [employer] [facility] to tolerance (UMBERTO, core).   Filippo Todd PT    Treatment Charges: Mins Units   Initial Evaluation     Re-Evaluation     Ther Exercise         TE 15 1   Manual Therapy     MT     Ther Activities        TA 20 1   Gait Training          GT     Neuro Re-education NR     Modalities           E-stim 15 1   Non-Billable Service Time     Other     Total Time/Units 50 3
Yes

## 2024-03-20 ENCOUNTER — OFFICE VISIT (OUTPATIENT)
Dept: SURGERY | Age: 49
End: 2024-03-20
Payer: COMMERCIAL

## 2024-03-20 VITALS — WEIGHT: 170 LBS | HEIGHT: 63 IN | BODY MASS INDEX: 30.12 KG/M2

## 2024-03-20 DIAGNOSIS — R22.9 MASS OF SKIN: Primary | ICD-10-CM

## 2024-03-20 PROCEDURE — 99203 OFFICE O/P NEW LOW 30 MIN: CPT | Performed by: PHYSICIAN ASSISTANT

## 2024-03-20 NOTE — PROGRESS NOTES
Department of Plastic Surgery - Adult  Attending Consult Note      CHIEF COMPLAINT:   Mass of back    History Obtained From:  patient    HISTORY OF PRESENT ILLNESS:                The patient is a 48 y.o. female who presents with left upper back subcutaneous mass.  The patient states that they first noticed the mass several years ago.  It has  grown in size since they first noticed the mass.  The mass has not had a history of discharge.  The pt has not had the mass biopsied previously.  The patient states the mass is  painfull.  The pt denies any associated symptoms.      Past Medical History:    Past Medical History:   Diagnosis Date    COVID-19 02/2021    asymptomatic w pos test result    GERD (gastroesophageal reflux disease)      Past Surgical History:    Past Surgical History:   Procedure Laterality Date    ENDOSCOPY, COLON, DIAGNOSTIC      TONSILLECTOMY       Current Medications:      Current Outpatient Medications   Medication Sig Dispense Refill    azelastine (ASTELIN) 0.1 % nasal spray 1-2 sprays by Nasal route 2 times daily as needed for Rhinitis Use in each nostril as directed 90 mL 1    fluticasone (FLONASE) 50 MCG/ACT nasal spray 2 sprays by Each Nostril route daily 48 g 1    cyclobenzaprine (FLEXERIL) 5 MG tablet TAKE 1 TABLET BY MOUTH EVERY DAY AS NEEDED      hydrOXYzine (ATARAX) 25 MG tablet TAKE 1 TABLET BY MOUTH EVERY 8 HOURS FOR 10 DAYS AS NEEDED      omeprazole (PRILOSEC) 40 MG delayed release capsule Take 1 capsule by mouth daily AM       No current facility-administered medications for this visit.     Allergies:  Patient has no known allergies.    Social History:   Social History     Socioeconomic History    Marital status:      Spouse name: Not on file    Number of children: Not on file    Years of education: Not on file    Highest education level: Not on file   Occupational History    Not on file   Tobacco Use    Smoking status: Former    Smokeless tobacco: Never   Vaping Use    Vaping

## 2024-04-18 ENCOUNTER — PROCEDURE VISIT (OUTPATIENT)
Dept: SURGERY | Age: 49
End: 2024-04-18

## 2024-04-18 VITALS — TEMPERATURE: 97.5 F

## 2024-04-18 DIAGNOSIS — R22.9 MASS OF SKIN: Primary | ICD-10-CM

## 2024-04-18 RX ORDER — LIDOCAINE HYDROCHLORIDE AND EPINEPHRINE 10; 10 MG/ML; UG/ML
1 INJECTION, SOLUTION INFILTRATION; PERINEURAL ONCE
Status: COMPLETED | OUTPATIENT
Start: 2024-04-18 | End: 2024-04-18

## 2024-04-18 RX ADMIN — LIDOCAINE HYDROCHLORIDE AND EPINEPHRINE 1 ML: 10; 10 INJECTION, SOLUTION INFILTRATION; PERINEURAL at 16:07

## 2024-04-18 NOTE — PROGRESS NOTES
Subjective:    Follow up today for excisional biopsy subcutaneous mass of back and cosmetic excision of benign dermatofibroma of the left dorsal wrist. Denies fever, nausea, vomiting, leg pain or swelling.  The patient voices understanding of the procedure they are having today and would like to proceed.  Patient states that the mass has been painful and growing.    Objective:    Temp 97.5 °F (36.4 °C) (Temporal)   LMP 04/02/2024 (Approximate)       subcutaneous mass of back  Lesion- 15mm x 15mm  Margin- 0mm  Defect- 15mm x 10mm  Scar-15mm     Benign dermatofibroma left dorsal wrist  Lesion- 8mm in diameter  Margin-0mm  Defect- 20mm x 15mm  Scar- 20mm    Assessment:    Patient Active Problem List   Diagnosis    Neck pain    Myofascial pain    Cervical radiculopathy    Encounter for preoperative screening laboratory testing for COVID-19 virus    Bimalleolar ankle fracture, right, closed, initial encounter       Plan:       Diagnosis  -) subcutaneous mass of back  -) benign dermatofibroma of left dorsal wrist  -) Pain    -The risks, benefits and options were discussed with the pt. The risks included but not limited to pain, bleeding, infection, heavy scarring, damage to surrounding structures, fluid collections, asymmetry, and need for further procedures. All of Her questions were answered to their satisfaction and She agrees to proceed with the procedure.      -After consent was obtained, the area was cleansed with an alcohol swab. Local anesthesia consisting of 1% lidocaine with 1:100,000 epinepherine was injected  surrounding the area. The local was allowed to work.  Using a 10-blade the subcutaneous mass of back was excised,  interemdiate repair was performed.  The wound(s) were closed with 3-0 monocryl and 3-0 monocryl stratafix hemostasis was obtained and a steristrip with tegaderm dressing was placed over the wound. Our attention was turned to the left dorsal wrist and performed excision of the benign

## 2024-04-23 LAB — SURGICAL PATHOLOGY REPORT: NORMAL

## 2024-04-25 NOTE — PROGRESS NOTES
Subjective:    Follow up today from  excisional biopsy subcutaneous mass of back and cosmetic excision of benign dermatofibroma of left dorsal wrist. Denies fever, nausea, vomiting, leg pain or swelling, pain is absent.  The pt states that they have  kept the wound site(s) covered as directed.    They state that their pain is absent.    Objective:    LMP 04/02/2024 (Approximate)       Wound: Clean, dry, and intact, no drainage.  No signs of infection, wound healing well.   Neuro- Sensation  intact to nasrin incisional site with light touch .      Assessment:    Patient Active Problem List   Diagnosis    Neck pain    Myofascial pain    Cervical radiculopathy    Encounter for preoperative screening laboratory testing for COVID-19 virus    Bimalleolar ankle fracture, right, closed, initial encounter       Labs: CBC:   Lab Results   Component Value Date/Time    WBC 12.6 12/14/2021 11:14 AM    RBC 4.57 12/14/2021 11:14 AM    HGB 14.1 12/14/2021 11:14 AM    HCT 42.1 12/14/2021 11:14 AM    MCV 92.1 12/14/2021 11:14 AM    MCH 30.9 12/14/2021 11:14 AM    MCHC 33.5 12/14/2021 11:14 AM    RDW 13.2 12/14/2021 11:14 AM     12/14/2021 11:14 AM    MPV 10.8 12/14/2021 11:14 AM     BMP:    Lab Results   Component Value Date/Time     12/14/2021 11:14 AM    K 4.5 12/14/2021 11:14 AM     12/14/2021 11:14 AM    CO2 25 12/14/2021 11:14 AM    BUN 8 12/14/2021 11:14 AM    CREATININE 0.7 12/14/2021 11:14 AM    CALCIUM 9.1 12/14/2021 11:14 AM    GFRAA >60 12/14/2021 11:14 AM    LABGLOM >60 12/14/2021 11:14 AM    GLUCOSE 99 12/14/2021 11:14 AM         Pathology Report-    Report Path Number: [id number]    -- Diagnosis --  Left upper back, excision: Epidermal inclusion cyst      Brittany Cobb MD  **Electronically Signed Out**        [facility]/4/23/2024       Pre-Operative Diagnosis  Mass of skin.       Plan:     Status post excisional biopsy subcutaneous mass of back and cosmetic excision of benign dermatofibroma of left

## 2024-04-26 ENCOUNTER — OFFICE VISIT (OUTPATIENT)
Dept: SURGERY | Age: 49
End: 2024-04-26

## 2024-04-26 VITALS — TEMPERATURE: 98.1 F

## 2024-04-26 DIAGNOSIS — L72.0 EPIDERMAL INCLUSION CYST: Primary | ICD-10-CM

## 2024-04-26 PROCEDURE — 99024 POSTOP FOLLOW-UP VISIT: CPT | Performed by: PLASTIC SURGERY

## 2025-04-07 ENCOUNTER — HOSPITAL ENCOUNTER (OUTPATIENT)
Dept: GENERAL RADIOLOGY | Age: 50
Discharge: HOME | End: 2025-04-09
Payer: COMMERCIAL

## 2025-04-07 VITALS — HEIGHT: 63 IN | WEIGHT: 170 LBS | BODY MASS INDEX: 30.12 KG/M2

## 2025-04-07 DIAGNOSIS — Z12.31 VISIT FOR SCREENING MAMMOGRAM: ICD-10-CM

## 2025-04-07 PROCEDURE — 77063 BREAST TOMOSYNTHESIS BI: CPT
